# Patient Record
Sex: FEMALE | URBAN - METROPOLITAN AREA
[De-identification: names, ages, dates, MRNs, and addresses within clinical notes are randomized per-mention and may not be internally consistent; named-entity substitution may affect disease eponyms.]

---

## 2020-11-17 ENCOUNTER — OFFICE VISIT (OUTPATIENT)
Dept: FAMILY MEDICINE | Facility: CLINIC | Age: 25
End: 2020-11-17
Payer: COMMERCIAL

## 2020-11-17 VITALS
HEART RATE: 101 BPM | WEIGHT: 152.6 LBS | TEMPERATURE: 97.6 F | DIASTOLIC BLOOD PRESSURE: 80 MMHG | BODY MASS INDEX: 25.43 KG/M2 | RESPIRATION RATE: 16 BRPM | HEIGHT: 65 IN | OXYGEN SATURATION: 97 % | SYSTOLIC BLOOD PRESSURE: 100 MMHG

## 2020-11-17 DIAGNOSIS — Z87.820 HISTORY OF CONCUSSION: ICD-10-CM

## 2020-11-17 DIAGNOSIS — Z11.3 SCREENING FOR STD (SEXUALLY TRANSMITTED DISEASE): ICD-10-CM

## 2020-11-17 DIAGNOSIS — Z00.00 ANNUAL PHYSICAL EXAM: Primary | ICD-10-CM

## 2020-11-17 DIAGNOSIS — Z86.19 HX OF HERPES GENITALIS: ICD-10-CM

## 2020-11-17 DIAGNOSIS — Z83.49 FAMILY HISTORY OF HASHIMOTO THYROIDITIS: ICD-10-CM

## 2020-11-17 PROCEDURE — 90471 IMMUNIZATION ADMIN: CPT | Performed by: FAMILY MEDICINE

## 2020-11-17 PROCEDURE — 90686 IIV4 VACC NO PRSV 0.5 ML IM: CPT | Performed by: FAMILY MEDICINE

## 2020-11-17 PROCEDURE — 99385 PREV VISIT NEW AGE 18-39: CPT | Mod: 25 | Performed by: FAMILY MEDICINE

## 2020-11-17 RX ORDER — VALACYCLOVIR HYDROCHLORIDE 500 MG/1
500 TABLET, FILM COATED ORAL DAILY
Qty: 30 TABLET | Refills: 1 | Status: SHIPPED | OUTPATIENT
Start: 2020-11-17 | End: 2020-12-14

## 2020-11-17 SDOH — HEALTH STABILITY: MENTAL HEALTH: HOW OFTEN DO YOU HAVE A DRINK CONTAINING ALCOHOL?: 2-4 TIMES A MONTH
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
## 2020-11-18 ENCOUNTER — TELEPHONE (OUTPATIENT)
Dept: FAMILY MEDICINE | Facility: CLINIC | Age: 25
End: 2020-11-18

## 2020-11-18 NOTE — TELEPHONE ENCOUNTER
Can you please call patient ask whats her question? Have her sign up for my radha and send me a msg

## 2020-11-18 NOTE — TELEPHONE ENCOUNTER
Patient seen in office yesterday-has question for Dr Torres about medication RX'd at visit. [phone]

## 2020-11-19 ENCOUNTER — TELEPHONE (OUTPATIENT)
Dept: FAMILY MEDICINE | Facility: CLINIC | Age: 25
End: 2020-11-19

## 2020-11-19 NOTE — TELEPHONE ENCOUNTER
Left message for asking her to sign up on the [facility] my chart, I asked her to return the call asking her what question she has about the medication that was prescribed at the visit.

## 2020-12-10 DIAGNOSIS — Z86.19 HX OF HERPES GENITALIS: ICD-10-CM

## 2020-12-10 NOTE — TELEPHONE ENCOUNTER
Medicine Refill Request    Last Office Visit: 11/17/2020  Last Telemedicine Visit: Visit date not found    Next Office Visit: 1/12/2021  Next Telemedicine Visit: Visit date not found         Current Outpatient Medications:   •  valACYclovir (VALTREX) 500 mg tablet, Take 1 tablet (500 mg total) by mouth daily for 7 days., Disp: 30 tablet, Rfl: 1      BP Readings from Last 3 Encounters:   11/17/20 100/80       Recent Lab results:  No results found for: CHOL, No results found for: HDL, No results found for: LDLCALC, No results found for: TRIG     No results found for: GLUCOSE, No results found for: HGBA1C      No results found for: CREATININE    No results found for: TSH

## 2020-12-14 RX ORDER — VALACYCLOVIR HYDROCHLORIDE 500 MG/1
TABLET, FILM COATED ORAL
Qty: 30 TABLET | Refills: 1 | Status: SHIPPED | OUTPATIENT
Start: 2020-12-14 | End: 2021-01-07

## 2020-12-30 LAB
ALBUMIN SERPL-MCNC: 4.5 G/DL (ref 3.9–5)
ALBUMIN/GLOB SERPL: 1.8 {RATIO} (ref 1.2–2.2)
ALP SERPL-CCNC: 56 IU/L (ref 39–117)
ALT SERPL-CCNC: 10 IU/L (ref 0–32)
AST SERPL-CCNC: 21 IU/L (ref 0–40)
BASOPHILS # BLD AUTO: 0.1 X10E3/UL (ref 0–0.2)
BASOPHILS NFR BLD AUTO: 1 %
BILIRUB SERPL-MCNC: 0.7 MG/DL (ref 0–1.2)
BUN SERPL-MCNC: 9 MG/DL (ref 6–20)
BUN/CREAT SERPL: 14 (ref 9–23)
CALCIUM SERPL-MCNC: 9.4 MG/DL (ref 8.7–10.2)
CHLORIDE SERPL-SCNC: 102 MMOL/L (ref 96–106)
CHOLEST SERPL-MCNC: 163 MG/DL (ref 100–199)
CO2 SERPL-SCNC: 21 MMOL/L (ref 20–29)
CREAT SERPL-MCNC: 0.64 MG/DL (ref 0.57–1)
EOSINOPHIL # BLD AUTO: 0.2 X10E3/UL (ref 0–0.4)
EOSINOPHIL NFR BLD AUTO: 2 %
ERYTHROCYTE [DISTWIDTH] IN BLOOD BY AUTOMATED COUNT: 17.3 % (ref 11.7–15.4)
GLOBULIN SER CALC-MCNC: 2.5 G/DL (ref 1.5–4.5)
GLUCOSE SERPL-MCNC: 89 MG/DL (ref 65–99)
HBA1C MFR BLD: 4.8 % (ref 4.8–5.6)
HCT VFR BLD AUTO: 41.7 % (ref 34–46.6)
HCV AB S/CO SERPL IA: <0.1 S/CO RATIO (ref 0–0.9)
HDLC SERPL-MCNC: 62 MG/DL
HGB BLD-MCNC: 12.9 G/DL (ref 11.1–15.9)
HIV 1+2 AB+HIV1 P24 AG SERPL QL IA: NON REACTIVE
IMM GRANULOCYTES # BLD AUTO: 0 X10E3/UL (ref 0–0.1)
IMM GRANULOCYTES NFR BLD AUTO: 0 %
LAB CORP EGFR IF AFRICN AM: 144 ML/MIN/1.73
LAB CORP EGFR IF NONAFRICN AM: 125 ML/MIN/1.73
LDLC SERPL CALC-MCNC: 79 MG/DL (ref 0–99)
LYMPHOCYTES # BLD AUTO: 3.3 X10E3/UL (ref 0.7–3.1)
LYMPHOCYTES NFR BLD AUTO: 38 %
MCH RBC QN AUTO: 20.5 PG (ref 26.6–33)
MCHC RBC AUTO-ENTMCNC: 30.9 G/DL (ref 31.5–35.7)
MCV RBC AUTO: 66 FL (ref 79–97)
MONOCYTES # BLD AUTO: 0.9 X10E3/UL (ref 0.1–0.9)
MONOCYTES NFR BLD AUTO: 10 %
NEUTROPHILS # BLD AUTO: 4.3 X10E3/UL (ref 1.4–7)
NEUTROPHILS NFR BLD AUTO: 49 %
PLATELET # BLD AUTO: 345 X10E3/UL (ref 150–450)
POTASSIUM SERPL-SCNC: 4.1 MMOL/L (ref 3.5–5.2)
PROT SERPL-MCNC: 7 G/DL (ref 6–8.5)
RBC # BLD AUTO: 6.3 X10E6/UL (ref 3.77–5.28)
SODIUM SERPL-SCNC: 137 MMOL/L (ref 134–144)
T4 FREE SERPL-MCNC: 1.09 NG/DL (ref 0.82–1.77)
TRIGL SERPL-MCNC: 128 MG/DL (ref 0–149)
TSH SERPL DL<=0.005 MIU/L-ACNC: 1.86 UIU/ML (ref 0.45–4.5)
VLDLC SERPL CALC-MCNC: 22 MG/DL (ref 5–40)
WBC # BLD AUTO: 8.7 X10E3/UL (ref 3.4–10.8)

## 2021-01-04 ENCOUNTER — TELEPHONE (OUTPATIENT)
Dept: FAMILY MEDICINE | Facility: CLINIC | Age: 26
End: 2021-01-04

## 2021-01-04 NOTE — TELEPHONE ENCOUNTER
----- Message from Rema Cotton DO sent at 12/30/2020 11:13 AM EST -----  Reviewed.  No clinically significant abnormalities. I recommend patient open [employer] account to have access to results and messaging.

## 2021-01-06 DIAGNOSIS — Z86.19 HX OF HERPES GENITALIS: ICD-10-CM

## 2021-01-06 NOTE — TELEPHONE ENCOUNTER
Medicine Refill Request    Last Office Visit: 11/17/2020  Last Telemedicine Visit: Visit date not found    Next Office Visit: 1/12/2021  Next Telemedicine Visit: Visit date not found         Current Outpatient Medications:   •  valACYclovir (VALTREX) 500 mg tablet, TAKE 1 TABLET BY MOUTH EVERY DAY FOR 7 DAYS, Disp: 30 tablet, Rfl: 1      BP Readings from Last 3 Encounters:   11/17/20 100/80       Recent Lab results:  Lab Results   Component Value Date    CHOL 163 12/29/2020   ,   Lab Results   Component Value Date    HDL 62 12/29/2020   ,   Lab Results   Component Value Date    LDLCALC 79 12/29/2020   ,   Lab Results   Component Value Date    TRIG 128 12/29/2020        Lab Results   Component Value Date    GLUCOSE 89 12/29/2020   ,   Lab Results   Component Value Date    HGBA1C 4.8 12/29/2020         Lab Results   Component Value Date    CREATININE 0.64 12/29/2020       Lab Results   Component Value Date    TSH 1.860 12/29/2020

## 2021-01-07 RX ORDER — VALACYCLOVIR HYDROCHLORIDE 500 MG/1
TABLET, FILM COATED ORAL
Qty: 30 TABLET | Refills: 1 | Status: SHIPPED | OUTPATIENT
Start: 2021-01-07 | End: 2021-01-29

## 2021-01-12 ENCOUNTER — OFFICE VISIT (OUTPATIENT)
Dept: FAMILY MEDICINE | Facility: CLINIC | Age: 26
End: 2021-01-12
Payer: COMMERCIAL

## 2021-01-12 VITALS
HEIGHT: 65 IN | WEIGHT: 154.2 LBS | DIASTOLIC BLOOD PRESSURE: 74 MMHG | BODY MASS INDEX: 25.69 KG/M2 | OXYGEN SATURATION: 98 % | SYSTOLIC BLOOD PRESSURE: 128 MMHG | HEART RATE: 86 BPM | RESPIRATION RATE: 14 BRPM

## 2021-01-12 DIAGNOSIS — Z30.011 ENCOUNTER FOR INITIAL PRESCRIPTION OF CONTRACEPTIVE PILLS: ICD-10-CM

## 2021-01-12 DIAGNOSIS — Z01.411 ENCOUNTER FOR GYNECOLOGICAL EXAMINATION (GENERAL) (ROUTINE) WITH ABNORMAL FINDINGS: Primary | ICD-10-CM

## 2021-01-12 DIAGNOSIS — Z86.19 HISTORY OF HERPES GENITALIS: ICD-10-CM

## 2021-01-12 LAB — PREGNANCY TEST URINE, POC: NEGATIVE

## 2021-01-12 PROCEDURE — S0612 ANNUAL GYNECOLOGICAL EXAMINA: HCPCS | Performed by: FAMILY MEDICINE

## 2021-01-12 PROCEDURE — 81025 URINE PREGNANCY TEST: CPT | Performed by: FAMILY MEDICINE

## 2021-01-12 RX ORDER — NORETHINDRONE ACETATE AND ETHINYL ESTRADIOL, ETHINYL ESTRADIOL AND FERROUS FUMARATE 1MG-10(24)
1 KIT ORAL DAILY
Qty: 84 TABLET | Refills: 3 | Status: SHIPPED | OUTPATIENT
Start: 2021-01-12 | End: 2021-12-06

## 2021-01-12 ASSESSMENT — PATIENT HEALTH QUESTIONNAIRE - PHQ9: SUM OF ALL RESPONSES TO PHQ9 QUESTIONS 1 & 2: 0

## 2021-01-12 NOTE — PROGRESS NOTES
"      Subjective      Patient ID: Mandi Villasenor is a 25 y.o. female.  1995      HPI  Here for a pap today   Hx of genital herpes - on meds   Last pap smear was normal - 3 yrs ago   Mom had breast cancer and GM on the dad side   Not trying to get pregnant   Using condoms - wants to go back on   Sexually active with 1 partner - man   Blood test with HIOV and hepatis c neg   Had hx of chlamydia once and was treated   Had the HPV 2 vaccination   LMP 1/6/2021 - regular last 5 days   Denied any anxiety / depression   Social drinker/ non smoker , no vaping, +marijuana   The following have been reviewed and updated as appropriate in this visit:  Allergies  Meds  Problems       Review of Systems   All other systems reviewed and are negative.      Objective     Vitals:    01/12/21 1615   BP: 128/74   BP Location: Right upper arm   Patient Position: Sitting   Pulse: 86   Resp: 14   SpO2: 98%   Weight: 69.9 kg (154 lb 3.2 oz)   Height: 1.651 m (5' 5\")     Body mass index is 25.66 kg/m².    Physical Exam  Vitals signs and nursing note reviewed. Exam conducted with a chaperone present.   Constitutional:       Appearance: Normal appearance.   HENT:      Nose: Nose normal.      Mouth/Throat:      Mouth: Mucous membranes are moist.   Eyes:      Pupils: Pupils are equal, round, and reactive to light.   Chest:      Breasts:         Right: Normal.         Left: Normal.   Abdominal:      General: Abdomen is flat. Bowel sounds are normal.      Palpations: Abdomen is soft.   Genitourinary:     General: Normal vulva.   Musculoskeletal: Normal range of motion.   Lymphadenopathy:      Upper Body:      Right upper body: No supraclavicular, axillary or pectoral adenopathy.      Left upper body: No supraclavicular, axillary or pectoral adenopathy.   Neurological:      General: No focal deficit present.      Mental Status: She is alert and oriented to person, place, and time.   Psychiatric:         Mood and Affect: Mood normal.         " Behavior: Behavior normal.     chaperone by Francoise     Assessment/Plan   Diagnoses and all orders for this visit:    Encounter for gynecological examination (general) (routine) with abnormal findings (Primary)  Comments:  exam normal   will send my chart msg with results   retest on 3 years if neg   Orders:  -     Cytology, Thinprep Pap    History of herpes genitalis  Comments:  stable     Encounter for initial prescription of contraceptive pills  Comments:  counseling about using condom and taking ocp given   Orders:  -     norethindrone-e.estradioL-iron (LO LOESTRIN FE) 1 mg-10 mcg (24)/10 mcg (2) per tablet; Take 1 tablet by mouth daily.  -     POCT pregnancy, urine      Patient verbalized understanding of the above treatment plan and repeated after me.   Patient informed to go to the ER or call our office if symptoms do not improve or get worse.   The student note is for teaching purposes. Please consider my note is the only valid note.

## 2021-01-13 ENCOUNTER — TELEPHONE (OUTPATIENT)
Dept: FAMILY MEDICINE | Facility: CLINIC | Age: 26
End: 2021-01-13

## 2021-01-15 LAB
C TRACH RRNA CVX QL NAA+PROBE: NEGATIVE
CYTOLOGIST CVX/VAG CYTO: NORMAL
CYTOLOGY CVX/VAG DOC CYTO: NORMAL
CYTOLOGY CVX/VAG DOC THIN PREP: NORMAL
DX ICD CODE: NORMAL
LAB CORP .: NORMAL
LAB CORP NOTE:: NORMAL
N GONORRHOEA RRNA CVX QL NAA+PROBE: NEGATIVE
OTHER STN SPEC: NORMAL
STAT OF ADQ CVX/VAG CYTO-IMP: NORMAL
T VAGINALIS RRNA SPEC QL NAA+PROBE: NEGATIVE

## 2021-01-19 ENCOUNTER — TELEPHONE (OUTPATIENT)
Dept: FAMILY MEDICINE | Facility: CLINIC | Age: 26
End: 2021-01-19

## 2021-01-19 NOTE — TELEPHONE ENCOUNTER
----- Message from Cindy Valerio MD sent at 1/19/2021 10:12 AM EST -----  Hi Mrs. Villasenor,   Your pap smear looks normal, repeat in 3 years.   Cindy Valerio MD

## 2021-01-29 DIAGNOSIS — Z86.19 HX OF HERPES GENITALIS: ICD-10-CM

## 2021-01-29 RX ORDER — VALACYCLOVIR HYDROCHLORIDE 500 MG/1
TABLET, FILM COATED ORAL
Qty: 30 TABLET | Refills: 1 | Status: SHIPPED | OUTPATIENT
Start: 2021-01-29 | End: 2021-03-02

## 2021-01-29 NOTE — TELEPHONE ENCOUNTER
Medicine Refill Request    Last Office Visit: 1/12/2021  Last Telemedicine Visit: Visit date not found    Next Office Visit: Visit date not found  Next Telemedicine Visit: Visit date not found         Current Outpatient Medications:   •  norethindrone-e.estradioL-iron (LO LOESTRIN FE) 1 mg-10 mcg (24)/10 mcg (2) per tablet, Take 1 tablet by mouth daily., Disp: 84 tablet, Rfl: 3  •  valACYclovir (VALTREX) 500 mg tablet, TAKE 1 TABLET BY MOUTH EVERY DAY FOR 7 DAYS, Disp: 30 tablet, Rfl: 1      BP Readings from Last 3 Encounters:   01/12/21 128/74   11/17/20 100/80       Recent Lab results:  Lab Results   Component Value Date    CHOL 163 12/29/2020   ,   Lab Results   Component Value Date    HDL 62 12/29/2020   ,   Lab Results   Component Value Date    LDLCALC 79 12/29/2020   ,   Lab Results   Component Value Date    TRIG 128 12/29/2020        Lab Results   Component Value Date    GLUCOSE 89 12/29/2020   ,   Lab Results   Component Value Date    HGBA1C 4.8 12/29/2020         Lab Results   Component Value Date    CREATININE 0.64 12/29/2020       Lab Results   Component Value Date    TSH 1.860 12/29/2020

## 2021-02-28 DIAGNOSIS — Z86.19 HX OF HERPES GENITALIS: ICD-10-CM

## 2021-03-02 RX ORDER — VALACYCLOVIR HYDROCHLORIDE 500 MG/1
TABLET, FILM COATED ORAL
Qty: 30 TABLET | Refills: 1 | Status: SHIPPED | OUTPATIENT
Start: 2021-03-02 | End: 2021-03-30

## 2021-03-27 DIAGNOSIS — Z86.19 HX OF HERPES GENITALIS: ICD-10-CM

## 2021-03-30 RX ORDER — VALACYCLOVIR HYDROCHLORIDE 500 MG/1
TABLET, FILM COATED ORAL
Qty: 30 TABLET | Refills: 1 | Status: SHIPPED | OUTPATIENT
Start: 2021-03-30 | End: 2021-04-27

## 2021-04-27 DIAGNOSIS — Z86.19 HX OF HERPES GENITALIS: ICD-10-CM

## 2021-04-27 RX ORDER — VALACYCLOVIR HYDROCHLORIDE 500 MG/1
TABLET, FILM COATED ORAL
Qty: 30 TABLET | Refills: 1 | Status: SHIPPED | OUTPATIENT
Start: 2021-04-27 | End: 2021-05-28

## 2021-05-26 DIAGNOSIS — Z86.19 HX OF HERPES GENITALIS: ICD-10-CM

## 2021-05-27 NOTE — TELEPHONE ENCOUNTER
Medicine Refill Request    Last Office Visit: 1/12/2021  Next Office Visit: Visit date not found        Current Outpatient Medications:   •  norethindrone-e.estradioL-iron (LO LOESTRIN FE) 1 mg-10 mcg (24)/10 mcg (2) per tablet, Take 1 tablet by mouth daily., Disp: 84 tablet, Rfl: 3  •  valACYclovir (VALTREX) 500 mg tablet, TAKE 1 TABLET BY MOUTH EVERY DAY FOR 7 DAYS, Disp: 30 tablet, Rfl: 1      BP Readings from Last 3 Encounters:   01/12/21 128/74   11/17/20 100/80       Recent Lab results:  Lab Results   Component Value Date    CHOL 163 12/29/2020   ,   Lab Results   Component Value Date    HDL 62 12/29/2020   ,   Lab Results   Component Value Date    LDLCALC 79 12/29/2020   ,   Lab Results   Component Value Date    TRIG 128 12/29/2020        Lab Results   Component Value Date    GLUCOSE 89 12/29/2020   ,   Lab Results   Component Value Date    HGBA1C 4.8 12/29/2020         Lab Results   Component Value Date    CREATININE 0.64 12/29/2020       Lab Results   Component Value Date    TSH 1.860 12/29/2020

## 2021-05-28 RX ORDER — VALACYCLOVIR HYDROCHLORIDE 500 MG/1
TABLET, FILM COATED ORAL
Qty: 30 TABLET | Refills: 1 | Status: SHIPPED | OUTPATIENT
Start: 2021-05-28 | End: 2021-07-01

## 2021-06-30 DIAGNOSIS — Z86.19 HX OF HERPES GENITALIS: ICD-10-CM

## 2021-07-01 RX ORDER — VALACYCLOVIR HYDROCHLORIDE 500 MG/1
TABLET, FILM COATED ORAL
Qty: 30 TABLET | Refills: 1 | Status: SHIPPED | OUTPATIENT
Start: 2021-07-01 | End: 2021-08-02

## 2021-08-02 DIAGNOSIS — Z86.19 HX OF HERPES GENITALIS: ICD-10-CM

## 2021-08-02 RX ORDER — VALACYCLOVIR HYDROCHLORIDE 500 MG/1
TABLET, FILM COATED ORAL
Qty: 30 TABLET | Refills: 1 | Status: SHIPPED | OUTPATIENT
Start: 2021-08-02 | End: 2021-08-30

## 2021-08-30 DIAGNOSIS — Z86.19 HX OF HERPES GENITALIS: ICD-10-CM

## 2021-08-30 RX ORDER — VALACYCLOVIR HYDROCHLORIDE 500 MG/1
TABLET, FILM COATED ORAL
Qty: 30 TABLET | Refills: 1 | Status: SHIPPED | OUTPATIENT
Start: 2021-08-30 | End: 2021-09-29

## 2021-09-29 DIAGNOSIS — Z86.19 HX OF HERPES GENITALIS: ICD-10-CM

## 2021-09-29 RX ORDER — VALACYCLOVIR HYDROCHLORIDE 500 MG/1
TABLET, FILM COATED ORAL
Qty: 30 TABLET | Refills: 1 | Status: SHIPPED | OUTPATIENT
Start: 2021-09-29 | End: 2021-10-25

## 2021-10-22 DIAGNOSIS — Z86.19 HX OF HERPES GENITALIS: ICD-10-CM

## 2021-10-25 RX ORDER — VALACYCLOVIR HYDROCHLORIDE 500 MG/1
TABLET, FILM COATED ORAL
Qty: 30 TABLET | Refills: 1 | Status: SHIPPED | OUTPATIENT
Start: 2021-10-25 | End: 2021-11-26

## 2021-11-25 DIAGNOSIS — Z86.19 HX OF HERPES GENITALIS: ICD-10-CM

## 2021-11-26 RX ORDER — VALACYCLOVIR HYDROCHLORIDE 500 MG/1
TABLET, FILM COATED ORAL
Qty: 30 TABLET | Refills: 1 | Status: SHIPPED | OUTPATIENT
Start: 2021-11-26 | End: 2021-12-27

## 2021-11-26 NOTE — TELEPHONE ENCOUNTER
Medicine Refill Request    Last Office Visit: 1/12/2021  Last Telemedicine Visit: Visit date not found    Next Office Visit: Visit date not found  Next Telemedicine Visit: Visit date not found         Current Outpatient Medications:   •  norethindrone-e.estradioL-iron (LO LOESTRIN FE) 1 mg-10 mcg (24)/10 mcg (2) per tablet, Take 1 tablet by mouth daily., Disp: 84 tablet, Rfl: 3  •  valACYclovir 500 mg tablet, TAKE 1 TABLET BY MOUTH EVERY DAY FOR 7 DAYS, Disp: 30 tablet, Rfl: 1      BP Readings from Last 3 Encounters:   01/12/21 128/74   11/17/20 100/80       Recent Lab results:  Lab Results   Component Value Date    CHOL 163 12/29/2020   ,   Lab Results   Component Value Date    HDL 62 12/29/2020   ,   Lab Results   Component Value Date    LDLCALC 79 12/29/2020   ,   Lab Results   Component Value Date    TRIG 128 12/29/2020        Lab Results   Component Value Date    GLUCOSE 89 12/29/2020   ,   Lab Results   Component Value Date    HGBA1C 4.8 12/29/2020         Lab Results   Component Value Date    CREATININE 0.64 12/29/2020       Lab Results   Component Value Date    TSH 1.860 12/29/2020

## 2021-12-06 DIAGNOSIS — Z30.011 ENCOUNTER FOR INITIAL PRESCRIPTION OF CONTRACEPTIVE PILLS: ICD-10-CM

## 2021-12-06 RX ORDER — NORETHINDRONE ACETATE AND ETHINYL ESTRADIOL, ETHINYL ESTRADIOL AND FERROUS FUMARATE 1MG-10(24)
KIT ORAL
Qty: 84 TABLET | Refills: 3 | Status: SHIPPED | OUTPATIENT
Start: 2021-12-06 | End: 2022-11-21

## 2021-12-25 DIAGNOSIS — Z86.19 HX OF HERPES GENITALIS: ICD-10-CM

## 2021-12-27 RX ORDER — VALACYCLOVIR HYDROCHLORIDE 500 MG/1
TABLET, FILM COATED ORAL
Qty: 30 TABLET | Refills: 1 | Status: SHIPPED | OUTPATIENT
Start: 2021-12-27 | End: 2022-01-26

## 2021-12-27 NOTE — TELEPHONE ENCOUNTER
Medicine Refill Request    Last Office: 1/12/2021   Last Consult Visit: Visit date not found  Last Telemedicine Visit: Visit date not found    Next Appointment: Visit date not found      Current Outpatient Medications:   •  LO LOESTRIN FE 1 mg-10 mcg (24)/10 mcg (2) per tablet, TAKE 1 TABLET BY MOUTH EVERY DAY, Disp: 84 tablet, Rfl: 3  •  valACYclovir 500 mg tablet, TAKE 1 TABLET BY MOUTH EVERY DAY FOR 7 DAYS, Disp: 30 tablet, Rfl: 1      BP Readings from Last 3 Encounters:   01/12/21 128/74   11/17/20 100/80       Recent Lab results:  Lab Results   Component Value Date    CHOL 163 12/29/2020   ,   Lab Results   Component Value Date    HDL 62 12/29/2020   ,   Lab Results   Component Value Date    LDLCALC 79 12/29/2020   ,   Lab Results   Component Value Date    TRIG 128 12/29/2020        Lab Results   Component Value Date    GLUCOSE 89 12/29/2020   ,   Lab Results   Component Value Date    HGBA1C 4.8 12/29/2020         Lab Results   Component Value Date    CREATININE 0.64 12/29/2020       Lab Results   Component Value Date    TSH 1.860 12/29/2020

## 2022-01-26 DIAGNOSIS — Z86.19 HX OF HERPES GENITALIS: ICD-10-CM

## 2022-01-26 RX ORDER — VALACYCLOVIR HYDROCHLORIDE 500 MG/1
TABLET, FILM COATED ORAL
Qty: 30 TABLET | Refills: 1 | Status: SHIPPED | OUTPATIENT
Start: 2022-01-26 | End: 2022-02-17

## 2022-02-17 DIAGNOSIS — Z86.19 HX OF HERPES GENITALIS: ICD-10-CM

## 2022-02-17 RX ORDER — VALACYCLOVIR HYDROCHLORIDE 500 MG/1
TABLET, FILM COATED ORAL
Qty: 30 TABLET | Refills: 1 | Status: SHIPPED | OUTPATIENT
Start: 2022-02-17 | End: 2022-03-17

## 2022-03-16 DIAGNOSIS — Z86.19 HX OF HERPES GENITALIS: ICD-10-CM

## 2022-03-16 NOTE — TELEPHONE ENCOUNTER
Medicine Refill Request    Last Office: 1/12/2021   Last Consult Visit: Visit date not found  Last Telemedicine Visit: Visit date not found    Next Appointment: Visit date not found      Current Outpatient Medications:   •  LO LOESTRIN FE 1 mg-10 mcg (24)/10 mcg (2) per tablet, TAKE 1 TABLET BY MOUTH EVERY DAY, Disp: 84 tablet, Rfl: 3  •  valACYclovir (VALTREX) 500 mg tablet, TAKE 1 TABLET BY MOUTH EVERY DAY FOR 7 DAYS, Disp: 30 tablet, Rfl: 1      BP Readings from Last 3 Encounters:   01/12/21 128/74   11/17/20 100/80       Recent Lab results:  Lab Results   Component Value Date    CHOL 163 12/29/2020   ,   Lab Results   Component Value Date    HDL 62 12/29/2020   ,   Lab Results   Component Value Date    LDLCALC 79 12/29/2020   ,   Lab Results   Component Value Date    TRIG 128 12/29/2020        Lab Results   Component Value Date    GLUCOSE 89 12/29/2020   ,   Lab Results   Component Value Date    HGBA1C 4.8 12/29/2020         Lab Results   Component Value Date    CREATININE 0.64 12/29/2020       Lab Results   Component Value Date    TSH 1.860 12/29/2020

## 2022-03-17 RX ORDER — VALACYCLOVIR HYDROCHLORIDE 500 MG/1
TABLET, FILM COATED ORAL
Qty: 30 TABLET | Refills: 1 | Status: SHIPPED | OUTPATIENT
Start: 2022-03-17 | End: 2022-04-19

## 2022-04-15 DIAGNOSIS — Z86.19 HX OF HERPES GENITALIS: ICD-10-CM

## 2022-04-19 RX ORDER — VALACYCLOVIR HYDROCHLORIDE 500 MG/1
TABLET, FILM COATED ORAL
Qty: 30 TABLET | Refills: 1 | Status: SHIPPED | OUTPATIENT
Start: 2022-04-19 | End: 2022-08-30 | Stop reason: SDUPTHER

## 2022-08-30 ENCOUNTER — OFFICE VISIT (OUTPATIENT)
Dept: FAMILY MEDICINE | Facility: CLINIC | Age: 27
End: 2022-08-30
Payer: COMMERCIAL

## 2022-08-30 VITALS
WEIGHT: 158.4 LBS | BODY MASS INDEX: 26.39 KG/M2 | SYSTOLIC BLOOD PRESSURE: 130 MMHG | RESPIRATION RATE: 16 BRPM | HEIGHT: 65 IN | DIASTOLIC BLOOD PRESSURE: 80 MMHG | OXYGEN SATURATION: 98 % | TEMPERATURE: 97.5 F

## 2022-08-30 DIAGNOSIS — Z78.9 TRYING TO GET PREGNANT: Primary | ICD-10-CM

## 2022-08-30 DIAGNOSIS — Z86.19 HX OF HERPES GENITALIS: ICD-10-CM

## 2022-08-30 PROCEDURE — 99213 OFFICE O/P EST LOW 20 MIN: CPT | Performed by: FAMILY MEDICINE

## 2022-08-30 PROCEDURE — 3008F BODY MASS INDEX DOCD: CPT | Performed by: FAMILY MEDICINE

## 2022-08-30 RX ORDER — VALACYCLOVIR HYDROCHLORIDE 500 MG/1
500 TABLET, FILM COATED ORAL DAILY
Qty: 30 TABLET | Refills: 1 | Status: SHIPPED | OUTPATIENT
Start: 2022-08-30 | End: 2022-09-29 | Stop reason: SDUPTHER

## 2022-08-30 NOTE — PROGRESS NOTES
"      Subjective      Patient ID: Mandi Villasenor is a 27 y.o. female.  1995      HPI  27-year-old female here for pregnancy counseling.   Patient is trying to get pregnant and she is has stopped taking her birth control pills she is using condoms right now till December and she thinks he is doing it and she wants to try getting pregnant with her  in December.  Patient has some questions about her herpes.  Patient and and her  has both history of her genital herpes last outbreak for her was more than a year and a half ago.  Patient is on suppressive treatment but she stopped using it for the past 2 months.  Patient has not had any issues with rashes or itching.    Patient was wondering if herpes can affect her pregnancy.  Patient reported no vaginal discharge no history of other STDs.  Patient had Pap smear done in January 2021 was normal STD negative HPV negative.    Patient is not a smoker she does not use any drugs she is trying to avoid alcohol  Patient denied any depression or anxiety.    Patient wants a refill on her Valtrex just in case if she has any episodes of herpes.   The following have been reviewed and updated as appropriate in this visit:   Tobacco         Review of Systems   All other systems reviewed and are negative.      Objective     Vitals:    08/30/22 1535 08/30/22 1555   BP: (!) 144/90 130/80   BP Location: Left upper arm    Patient Position: Sitting    Resp: 16    Temp: 36.4 °C (97.5 °F)    TempSrc: Temporal    SpO2: 98%    Weight: 71.8 kg (158 lb 6.4 oz)    Height: 1.651 m (5' 5\")      Body mass index is 26.36 kg/m².    Physical Exam  Vitals and nursing note reviewed.   Constitutional:       Appearance: Normal appearance.   HENT:      Head: Normocephalic and atraumatic.      Right Ear: Tympanic membrane normal.      Nose: Nose normal.   Musculoskeletal:      Cervical back: Normal range of motion.   Neurological:      General: No focal deficit present.      Mental Status: " She is alert and oriented to person, place, and time.   Psychiatric:         Mood and Affect: Mood normal.         Behavior: Behavior normal.         Thought Content: Thought content normal.         Judgment: Judgment normal.         Assessment/Plan   Diagnoses and all orders for this visit:    Trying to get pregnant (Primary)  Comments:  Advised patient to take prenatal vitamin eat healthy avoid alcohol smoking.  Patient will try to actively getting pregnant around December she will let us know     Hx of herpes genitalis  Comments:  Given medication refills advised patient if she wants to stop it she will watch out for any breakouts if she has  She can take 1000 mg twice daily for 3 to 5 d  Orders:  -     valACYclovir (VALTREX) 500 mg tablet; Take 1 tablet (500 mg total) by mouth daily.      Patient verbalized understanding of the above treatment plan and repeated after me.   Patient informed to go to the ER or call our office if symptoms do not improve or get worse.   The student note is for teaching purposes. Please consider my note is the only valid note.

## 2022-09-29 DIAGNOSIS — Z86.19 HX OF HERPES GENITALIS: ICD-10-CM

## 2022-09-29 RX ORDER — VALACYCLOVIR HYDROCHLORIDE 500 MG/1
500 TABLET, FILM COATED ORAL DAILY
Qty: 90 TABLET | Refills: 1 | Status: SHIPPED | OUTPATIENT
Start: 2022-09-29

## 2022-09-29 NOTE — TELEPHONE ENCOUNTER
Medicine Refill Request    Last Office: 8/30/2022   Last Consult Visit: Visit date not found  Last Telemedicine Visit: Visit date not found    Next Appointment: Visit date not found      Current Outpatient Medications:     LO LOESTRIN FE 1 mg-10 mcg (24)/10 mcg (2) per tablet, TAKE 1 TABLET BY MOUTH EVERY DAY (Patient not taking: Reported on 8/30/2022), Disp: 84 tablet, Rfl: 3    valACYclovir (VALTREX) 500 mg tablet, Take 1 tablet (500 mg total) by mouth daily., Disp: 30 tablet, Rfl: 1      BP Readings from Last 3 Encounters:   08/30/22 130/80   01/12/21 128/74   11/17/20 100/80       Recent Lab results:  Lab Results   Component Value Date    CHOL 163 12/29/2020   ,   Lab Results   Component Value Date    HDL 62 12/29/2020   ,   Lab Results   Component Value Date    LDLCALC 79 12/29/2020   ,   Lab Results   Component Value Date    TRIG 128 12/29/2020        Lab Results   Component Value Date    GLUCOSE 89 12/29/2020   ,   Lab Results   Component Value Date    HGBA1C 4.8 12/29/2020         Lab Results   Component Value Date    CREATININE 0.64 12/29/2020       Lab Results   Component Value Date    TSH 1.860 12/29/2020

## 2022-11-20 DIAGNOSIS — Z30.011 ENCOUNTER FOR INITIAL PRESCRIPTION OF CONTRACEPTIVE PILLS: ICD-10-CM

## 2022-11-21 RX ORDER — NORETHINDRONE ACETATE AND ETHINYL ESTRADIOL, ETHINYL ESTRADIOL AND FERROUS FUMARATE 1MG-10(24)
KIT ORAL
Qty: 84 TABLET | Refills: 3 | Status: SHIPPED | OUTPATIENT
Start: 2022-11-21 | End: 2023-09-19

## 2022-11-21 NOTE — TELEPHONE ENCOUNTER
Medicine Refill Request    Last Office: 8/30/2022   Last Consult Visit: Visit date not found  Last Telemedicine Visit: Visit date not found    Next Appointment: Visit date not found      Current Outpatient Medications:     LO LOESTRIN FE 1 mg-10 mcg (24)/10 mcg (2) per tablet, TAKE 1 TABLET BY MOUTH EVERY DAY (Patient not taking: Reported on 8/30/2022), Disp: 84 tablet, Rfl: 3    valACYclovir (VALTREX) 500 mg tablet, Take 1 tablet (500 mg total) by mouth daily., Disp: 90 tablet, Rfl: 1      BP Readings from Last 3 Encounters:   08/30/22 130/80   01/12/21 128/74   11/17/20 100/80       Recent Lab results:  Lab Results   Component Value Date    CHOL 163 12/29/2020   ,   Lab Results   Component Value Date    HDL 62 12/29/2020   ,   Lab Results   Component Value Date    LDLCALC 79 12/29/2020   ,   Lab Results   Component Value Date    TRIG 128 12/29/2020        Lab Results   Component Value Date    GLUCOSE 89 12/29/2020   ,   Lab Results   Component Value Date    HGBA1C 4.8 12/29/2020         Lab Results   Component Value Date    CREATININE 0.64 12/29/2020       Lab Results   Component Value Date    TSH 1.860 12/29/2020

## 2023-09-19 ENCOUNTER — OFFICE VISIT (OUTPATIENT)
Dept: FAMILY MEDICINE | Facility: CLINIC | Age: 28
End: 2023-09-19
Payer: COMMERCIAL

## 2023-09-19 VITALS
HEIGHT: 65 IN | TEMPERATURE: 98.3 F | HEART RATE: 98 BPM | DIASTOLIC BLOOD PRESSURE: 78 MMHG | RESPIRATION RATE: 16 BRPM | BODY MASS INDEX: 27.16 KG/M2 | WEIGHT: 163 LBS | SYSTOLIC BLOOD PRESSURE: 136 MMHG | OXYGEN SATURATION: 98 %

## 2023-09-19 DIAGNOSIS — Z31.69 ENCOUNTER FOR PRECONCEPTION CONSULTATION: Primary | ICD-10-CM

## 2023-09-19 LAB
EXPIRATION DATE: ABNORMAL
Lab: ABNORMAL
POCT MANUFACTURER: ABNORMAL
PREGNANCY TEST URINE, POC: POSITIVE

## 2023-09-19 PROCEDURE — 99213 OFFICE O/P EST LOW 20 MIN: CPT | Mod: GE,25

## 2023-09-19 PROCEDURE — 90471 IMMUNIZATION ADMIN: CPT

## 2023-09-19 PROCEDURE — 90686 IIV4 VACC NO PRSV 0.5 ML IM: CPT

## 2023-09-19 PROCEDURE — 3008F BODY MASS INDEX DOCD: CPT

## 2023-09-19 PROCEDURE — 81025 URINE PREGNANCY TEST: CPT

## 2023-09-19 ASSESSMENT — PATIENT HEALTH QUESTIONNAIRE - PHQ9: SUM OF ALL RESPONSES TO PHQ9 QUESTIONS 1 & 2: 0

## 2023-09-19 NOTE — PATIENT INSTRUCTIONS
Pregnancy Checklist:    Diet: please take about 400-800mcg of supplemental folate in your diet. Do not over-eat vitamins - high amounts of vitamin A have been linked to negative pregnancy outcomes. As for food, avoid undercooked meats, cheeses, and fish.  Medications: please review your medications with your doctor to see if they are teratogenic (harmful to the baby).   Please avoid nicotine, alcohol, THC (marijuana), and opioids. Coffee/soda (caffeinated beverages) are okay in small amounts.

## 2023-09-19 NOTE — PROGRESS NOTES
"Subjective     Patient ID: Mandi Villasenor is a 28 y.o. female is here for the following:  Chief Complaint   Patient presents with    pregnancy        HPI   Preconception Visit  LMP: August 10th  Chronic Diseases: none  Medications (any teratogenic): none  Reproductive History: none  Genetic Conditions/FHx: cousin with unicornuate (?) uterus; cousin has some sort of genetic disorder related to MS. Mom has hashimoto's.  Substance use: none  Infectious disease (GC, HIV, Hep C, TB): low risk - [place]  Nutrition (folate): taking prenatal vitamins  Weight management: no need.  Social and mental health concerns: none    Has been with current partner for 5 years.  for over a year.    Review of Systems   Per HPI    The following have been reviewed and updated as appropriate in this visit:   Allergies  Meds  Problems         Patient Active Problem List   Diagnosis    Vasodepressor syncope    Encounter for preconception consultation         Current Outpatient Medications:     valACYclovir (VALTREX) 500 mg tablet, Take 1 tablet (500 mg total) by mouth daily., Disp: 90 tablet, Rfl: 1    No Known Allergies    Objective   Vitals:    09/19/23 1807   BP: 136/78   BP Location: Right upper arm   Patient Position: Sitting   Pulse: 98   Resp: 16   Temp: 36.8 °C (98.3 °F)   TempSrc: Temporal   SpO2: 98%   Weight: 73.9 kg (163 lb)   Height: 1.651 m (5' 5\")     Body mass index is 27.12 kg/m².    Physical Exam  Constitutional:       Appearance: Normal appearance.   Cardiovascular:      Rate and Rhythm: Normal rate and regular rhythm.      Heart sounds: Normal heart sounds. No murmur heard.     No friction rub. No gallop.   Pulmonary:      Effort: Pulmonary effort is normal. No respiratory distress.      Breath sounds: Normal breath sounds. No stridor. No wheezing, rhonchi or rales.   Chest:      Chest wall: No tenderness.   Musculoskeletal:      Right lower leg: No edema.      Left lower leg: No edema. "   Neurological:      Mental Status: She is alert.         Assessment/Plan    Diagnoses and all orders for this visit:    Encounter for preconception consultation (Primary)  Assessment & Plan:  6 weeks pregnant.  Beta-hcg today positive  No risk factors for complicated pregnancy  First pregnancy  Taking folate    - referred to ob for further care  - no concerns at today's visit  - recommended she obtain covid vaccine at future visit at pharmacy    Orders:  -     Influenza vaccine quadrivalent preservative free 6 mon and older IM (FluLaval)  -     Ambulatory referral to Obstetrics / Gynecology  -     POCT pregnancy, urine       No follow-ups on file.        Alexander Moya MD  9/19/2023

## 2023-09-27 ENCOUNTER — TELEPHONE (OUTPATIENT)
Dept: OBGYN CLINIC | Facility: CLINIC | Age: 28
End: 2023-09-27

## 2023-09-27 NOTE — TELEPHONE ENCOUNTER
Called and left vm for pt with appt for FPN appt with Dr. Jennifer Louise in [place] on 10/16 at 2:40. RN appt will be virtual.  When pt calls back please confirm appt time/date and ask what time would work for virtual visit. Please advise LG of scheduling.    JULIA

## 2023-09-29 NOTE — PROGRESS NOTES
I have discussed the patient's care with the Resident. I have reviewed the patient's history and Resident's findings on exam, the patient's diagnosis/differential diagnosis and treatment plan. I concur with the treatment plan as documented by the Resident, except for my comments below or within additional notes today.    Mian Bocanegra MD

## 2023-10-11 NOTE — TELEPHONE ENCOUNTER
Left message for pt offering her morning of 10/12 or morning of 10/16 for her virtual B intake visit (per LG).

## 2023-10-11 NOTE — TELEPHONE ENCOUNTER
Patient called to let us know that no one has called her to schedule her OB intake Virtual visit. Patient's first OB visit is scheduled with BM on 10/16. Please schedule patient for intake virtual visit. Thank you!

## 2023-10-16 ENCOUNTER — INITIAL PRENATAL (OUTPATIENT)
Dept: OBGYN CLINIC | Facility: CLINIC | Age: 28
End: 2023-10-16

## 2023-10-16 ENCOUNTER — TELEMEDICINE (OUTPATIENT)
Dept: OBGYN CLINIC | Facility: CLINIC | Age: 28
End: 2023-10-16

## 2023-10-16 VITALS
HEIGHT: 65 IN | DIASTOLIC BLOOD PRESSURE: 58 MMHG | WEIGHT: 163 LBS | SYSTOLIC BLOOD PRESSURE: 118 MMHG | BODY MASS INDEX: 27.16 KG/M2

## 2023-10-16 DIAGNOSIS — Z13.71 SCREENING FOR GENETIC DISEASE CARRIER STATUS: ICD-10-CM

## 2023-10-16 DIAGNOSIS — Z34.91 PRENATAL CARE IN FIRST TRIMESTER: ICD-10-CM

## 2023-10-16 DIAGNOSIS — Z11.3 ROUTINE SCREENING FOR STI (SEXUALLY TRANSMITTED INFECTION): ICD-10-CM

## 2023-10-16 DIAGNOSIS — A60.09 GENITAL HERPES AFFECTING PREGNANCY IN FIRST TRIMESTER: ICD-10-CM

## 2023-10-16 DIAGNOSIS — Z12.4 SCREENING FOR CERVICAL CANCER: ICD-10-CM

## 2023-10-16 DIAGNOSIS — F12.91 HISTORY OF MARIJUANA USE: ICD-10-CM

## 2023-10-16 DIAGNOSIS — Z83.3 FAMILY HISTORY OF GESTATIONAL DIABETES: ICD-10-CM

## 2023-10-16 DIAGNOSIS — O98.311 GENITAL HERPES AFFECTING PREGNANCY IN FIRST TRIMESTER: ICD-10-CM

## 2023-10-16 DIAGNOSIS — Z34.91 PREGNANCY WITH UNCERTAIN DATES IN FIRST TRIMESTER: Primary | ICD-10-CM

## 2023-10-16 DIAGNOSIS — Z84.89 FAMILY HISTORY OF GENETIC DISORDER: ICD-10-CM

## 2023-10-16 DIAGNOSIS — Z3A.09 9 WEEKS GESTATION OF PREGNANCY: ICD-10-CM

## 2023-10-16 DIAGNOSIS — Z36.89 ENCOUNTER FOR OTHER SPECIFIED ANTENATAL SCREENING: Primary | ICD-10-CM

## 2023-10-16 DIAGNOSIS — Z36.89 ENCOUNTER FOR OTHER SPECIFIED ANTENATAL SCREENING: ICD-10-CM

## 2023-10-16 PROBLEM — O98.319 GENITAL HERPES AFFECTING PREGNANCY: Status: ACTIVE | Noted: 2023-10-16

## 2023-10-16 PROBLEM — A60.00 GENITAL HSV: Status: ACTIVE | Noted: 2023-10-16

## 2023-10-16 LAB
EXTERNAL CHLAMYDIA SCREEN: NEGATIVE
EXTERNAL GONORRHEA SCREEN: NEGATIVE
SL AMB  POCT GLUCOSE, UA: NEGATIVE
SL AMB POCT URINE PROTEIN: NEGATIVE

## 2023-10-16 RX ORDER — VALACYCLOVIR HYDROCHLORIDE 500 MG/1
500 TABLET, FILM COATED ORAL 2 TIMES DAILY
COMMUNITY

## 2023-10-16 RX ORDER — CETIRIZINE HYDROCHLORIDE 10 MG/1
TABLET ORAL
COMMUNITY
End: 2023-10-16

## 2023-10-16 NOTE — ASSESSMENT & PLAN NOTE
- Patient has a paternal cousin with SMA  - SMA/CF carrier screening ordered with initial prenatal panel. ,

## 2023-10-16 NOTE — PROGRESS NOTES
[address]  [address] [facility], [place], [address]    Assessment/Plan:  Audelia Degroot is a 33y.o. year old who presents for evaluation of amenorrhea. Amenorrhea  - Single live IUP identified on US today. Estimated Date of Delivery: 5/16/24 based on LMP. - Recommend prenatal vitamin with folic acid  - Ultrasound completed, please see Chart Review - Ob Procedures, Ultrasound Report for Interpretation.  - Nursing notes from OB intake reviewed. - Aneuploidy screening discussed. Patient desires screening. Referred to [facility] for NT and genetic counseling.  - Routine cervical cancer screening: Pap Done today. - Routine STI Screening: GC/Chlamydia sent today. HIV/Hep B/Syphilis ordered in prenatal panel.  - Patient Education: Patient was counseled regarding diet, exercise, weight gain, foods to avoid, vaccines in pregnancy, aneuploidy screening, travel precautions to include seat belt use and VTE risk reduction. She has been provided our pregnancy packet which includes how and when to contact providers, medication recommendations, dietary suggestions, breastfeeding information as well as websites for additional information, hospital and delivery concerns. Additional Pregnancy Problems:   1. Pregnancy with uncertain dates in first trimester  -     AMB US OB < 14 weeks single or first gestation level 1    2. Family history of genetic disorder  Assessment & Plan:  - Patient has a paternal cousin with SMA  - SMA/CF carrier screening ordered with initial prenatal panel. Orders:  -     Inheritest CF/SMA Panel; Future  -     Inheritest CF/SMA Panel    3. Genital herpes affecting pregnancy in first trimester    4. History of marijuana use  Assessment & Plan:  - Quit with knowledge of pregnancy.    - UDS sent with initial prenatal panel, per [facility] protocol  - Patient counseled regarding recommendation to abstain from cannabis products in pregnancy due to some evidence to suggest negative impact on fetal neurologic development.   - Plan for repeat UDS on admission. Patient agrees. Orders:  -     Ambulatory Referral to Maternal Fetal Medicine; Future; Expected date: 10/17/2023  -     Rapid drug screen, urine; Future  -     Rapid drug screen, urine    5. Encounter for other specified  screening  -     Pregnancy, Initial Screen; Future  -     Ambulatory Referral to Social Work Care Management Program; Future  -     Ambulatory Referral to Maternal Fetal Medicine; Future; Expected date: 10/17/2023  -     POCT urine dip  -     Pregnancy, Initial Screen    6. Prenatal care in first trimester  -     Ambulatory Referral to Social Work Care Management Program; Future    7. Screening for genetic disease carrier status  -     Inheritest CF/SMA Panel; Future  -     Inheritest CF/SMA Panel    8. Screening for cervical cancer  -     IGP, CtNg, rfx Aptima HPV ASCU    9. Routine screening for STI (sexually transmitted infection)  -     IGP, CtNg, rfx Aptima HPV ASCU    10. 9 weeks gestation of pregnancy        Subjective:   CC:  Amenorrhea  Felix Zhao is a 33 y.o.  female who presents for amenorrhea, now with confirmed viable pregnancy. Pregnancy ROS: No leakage of fluid, pelvic pain, or vaginal bleeding.         Past Medical History:   Diagnosis Date    Concussion 2020    MRI was negative for torticy    Herpes 2018    Vasodepressor syncope     between ages [age]     Past Surgical History:   Procedure Laterality Date    CHALAZION EXCISION Right     right eye    WRIST FRACTURE SURGERY Right 2019    plates and screws     Family History   Problem Relation Age of Onset    Hypertension Mother     Diabetes Mother         Gestational    Thyroid disease Mother         Hypothyriodism    Migraines Father     Gallbladder disease Father     No Known Problems Brother     No Known Problems Maternal Grandmother     Gallbladder disease Maternal Grandfather     Hyperlipidemia Paternal Grandmother     Ulcers Paternal Grandmother     Prostate cancer Paternal Grandfather     Diabetes Maternal Aunt      Social History     Socioeconomic History    Marital status: /Civil Union     Spouse name: None    Number of children: None    Years of education: None    Highest education level: None   Occupational History    None   Tobacco Use    Smoking status: Former     Packs/day: 0.25     Years: 3.00     Total pack years: 0.75     Types: Cigarettes     Start date: 2015     Quit date: 2018     Years since quittin.1    Smokeless tobacco: Never   Vaping Use    Vaping Use: Former    Quit date: 2023    Substances: Nicotine   Substance and Sexual Activity    Alcohol use: Not Currently     Comment: Overall 3 drinks per week    Drug use: Not Currently     Types: Marijuana     Comment: last use 2023    Sexual activity: Yes     Partners: Male     Birth control/protection: None   Other Topics Concern    None   Social History Narrative    None     Social Determinants of Health     Financial Resource Strain: Not on file   Food Insecurity: Not on file   Transportation Needs: Not on file   Physical Activity: Not on file   Stress: Not on file   Social Connections: Not on file   Intimate Partner Violence: Not on file   Housing Stability: Not on file     Outpatient Medications Marked as Taking for the 10/16/23 encounter (Initial Prenatal) with Alysa Orozco MD   Medication    Prenatal Vit-Fe Fumarate-FA (PRENATAL VITAMIN PO)    valACYclovir (VALTREX) 500 mg tablet     Allergies   Allergen Reactions    Seasonal Ic [Octacosanol] Allergic Rhinitis    Pork-Derived Products - Food Allergy Rash     FIRST TRIMESTER OBSTETRIC ULTRASOUND  Date of study: 10/16/2023  Performed by: Caro Farley MD     INDICATION: Amenorrhea, viability    COMPARISON: None. TECHNIQUE:   Transvaginal imaging was performed to assess the gestation, myometrial/endometrial architecture and ovarian parenchymal detail.     The study includes volumetric sweeps and traditional still imaging technique. FINDINGS:     A single intrauterine gestation is identified. Cardiac activity is detected at 182 bpm.      YOLK SAC:  Present and normal in size and appearance. MEAN CROWN RUMP LENGTH:  30.1 mm = 9 weeks 5 days   AMNIOTIC FLUID/SAC SHAPE:  Within expected normal range. UTERUS/ADNEXA:   No adnexal mass or pathologic cyst.  No free fluid identified. IMPRESSION:    Patient's last menstrual period was 08/10/2023 (exact date). Gestational age based on LMP: 11w1d  Gestational age based on US: 6w9d    Will assign dating based on LMP  Final Gestational age: 11w1d   Final Estimated Date of Delivery: 24   Fetal cardiac activity detected. No adnexal masses seen. Taylor Canela MD  10/16/2023 3:27 PM        Objective:     /58   Ht 5' 4.5" (1.638 m)   Wt 73.9 kg (163 lb)   LMP 08/10/2023 (Exact Date)   BMI 27.55 kg/m²   Pregravid Weight/BMI: 70.3 kg (155 lb) (BMI 26.20)  Current Weight: 73.9 kg (163 lb)   Total Weight Gain: 3.629 kg (8 lb)   Pre-Liz Vitals      Flowsheet Row Most Recent Value   Prenatal Assessment    Fetal Heart Rate 182   Fundal Height (cm) 9 cm   Prenatal Vitals    Blood Pressure 118/58   Weight - Scale 73.9 kg (163 lb)   Urine Albumin/Glucose    Dilation/Effacement/Station    Vaginal Drainage    Draining Fluid No   Edema    LLE Edema None   RLE Edema None   Facial Edema None             Chaperone present? Yes: Martha Vences MA. General Appearance: alert and oriented, in no acute distress. Neck/Thyroid: No thyromegaly, no thyroid nodules. Respiratory: Unlabored breathing. Cardiovascular: Regular rate, no peripheral edema. Abdomen: Soft, non-tender, non-distended, no masses, no rebound or guarding. Breast Exam: No dimpling, nipple retraction or discharge. No lumps or masses. No axillary or supraclavicular nodes. Pelvic:       External genitalia: Normal appearance, no abnormal pigmentation, no lesions or masses.  Normal Bartholin's and Brenham's. Urinary system: Urethral meatus normal, bladder non-tender. Vaginal: normal mucosa without prolapse or lesions. Normal-appearing physiologic discharge. Cervix: Normal-appearing, well-epithelialized, no gross lesions or masses. No cervical motion tenderness. Adnexa: No adnexal masses or tenderness noted. Uterus: Approximately 9 week-sized, regular contour, midline, mobile, no uterine tenderness. Extremities: Normal range of motion. Warm, well-perfused, non-tender.    Skin: normal, no rash or abnormalities  Neurologic: alert, oriented x3  Psychiatric: Appropriate affect, mood stable, cooperative with exam.        Yovana Mcghee MD  10/16/2023 3:37 PM
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
## 2023-10-16 NOTE — ASSESSMENT & PLAN NOTE
- Quit with knowledge of pregnancy. - UDS sent with initial prenatal panel, per [facility] protocol  - Patient counseled regarding recommendation to abstain from cannabis products in pregnancy due to some evidence to suggest negative impact on fetal neurologic development.   - Plan for repeat UDS on admission. Patient agrees. No driving until cleared  Daily dry dressing changes with gauze and tape   No lotions or ointment to incision site   Continue wearing compression stocking on during the day and off at night

## 2023-10-17 ENCOUNTER — TELEPHONE (OUTPATIENT)
Dept: PERINATAL CARE | Facility: OTHER | Age: 28
End: 2023-10-17

## 2023-10-17 NOTE — TELEPHONE ENCOUNTER
Called patient to schedule MFM appointment, based on referral issued to Maternal Fetal Medicine by [facility] office. Left voicemail requesting patient to call back and schedule appointment, with office number for return call [phone].

## 2023-10-19 ENCOUNTER — PATIENT OUTREACH (OUTPATIENT)
Dept: OBGYN CLINIC | Facility: CLINIC | Age: 28
End: 2023-10-19

## 2023-10-19 LAB
C TRACH RRNA CVX QL NAA+PROBE: NEGATIVE
CYTOLOGIST CVX/VAG CYTO: NORMAL
DX ICD CODE: NORMAL
N GONORRHOEA RRNA CVX QL NAA+PROBE: NEGATIVE
OTHER STN SPEC: NORMAL
OTHER STN SPEC: NORMAL
PATH REPORT.FINAL DX SPEC: NORMAL
SL AMB NOTE:: NORMAL
SL AMB SPECIMEN ADEQUACY: NORMAL
SL AMB TEST METHODOLOGY: NORMAL

## 2023-10-19 NOTE — PROGRESS NOTES
MODESTO referred for initial prenatal assessment. Patient is Edward Rose with an GYPSY of 5/16/24. SW called patient today to complete assessment, no answer. SW left  requesting a return call. Patient's next appointment is scheduled for DOMONIQUE Carranza on 11/24/23 at 11:20AM.     Addendum-    Patient called SW back and left  stating she would be available after work at 2:15PM. Luba Jung called patient back at requested time. Patient reports she and FOB ( Юлия Ndiaye) are excited for pregnancy. They have told their parents and a few coworkers, who are all supportive. Patient and FOB live in [place]; they just bought their house last November. They both drive. 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 Plans to be on maternity leave and then go into summer break so she has time with baby before next school year. She denies current financial concerns or need for WIC/SNAP/MA information, parenting education, or baby supply resources. Patient denies current or h/o mental health (did see free therapist through work for 12 weeks, found it helpful but does not need therapy now), DV/IPV, CYS involvement, and legal concerns. Did indicate h/o recreational THC use - has not used since discovering pregnancy and does not plan to. Denies any other substance use. She reports good support from family and her best friend. Enjoys watching movies, relaxing, and cooking for stress relief. No other needs identified at this time, SW closing referral. Please re-refer as needed.

## 2023-11-01 ENCOUNTER — TELEPHONE (OUTPATIENT)
Dept: OBGYN CLINIC | Facility: CLINIC | Age: 28
End: 2023-11-01
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
## 2023-11-04 LAB
EXTERNAL ANTIBODY SCREEN: NORMAL
EXTERNAL HEMOGLOBIN: 11 G/DL
EXTERNAL HEPATITIS B SURFACE ANTIGEN: NEGATIVE
EXTERNAL HIV-1/2 AB-AG: NORMAL
EXTERNAL PLATELET COUNT: 364 K/ÂΜL
EXTERNAL RH FACTOR: POSITIVE
EXTERNAL RUBELLA IGG QUANTITATION: NORMAL
EXTERNAL SYPHILIS TOTAL IGG/IGM SCREENING: NORMAL

## 2023-11-07 ENCOUNTER — ROUTINE PRENATAL (OUTPATIENT)
Dept: PERINATAL CARE | Facility: OTHER | Age: 28
End: 2023-11-07
Payer: COMMERCIAL

## 2023-11-07 VITALS
WEIGHT: 162.6 LBS | HEART RATE: 109 BPM | SYSTOLIC BLOOD PRESSURE: 120 MMHG | BODY MASS INDEX: 27.09 KG/M2 | DIASTOLIC BLOOD PRESSURE: 66 MMHG | HEIGHT: 65 IN

## 2023-11-07 DIAGNOSIS — Z36.9 UNSPECIFIED ANTENATAL SCREENING: ICD-10-CM

## 2023-11-07 DIAGNOSIS — Z36.82 ENCOUNTER FOR NUCHAL TRANSLUCENCY TESTING: ICD-10-CM

## 2023-11-07 DIAGNOSIS — Z3A.12 12 WEEKS GESTATION OF PREGNANCY: ICD-10-CM

## 2023-11-07 DIAGNOSIS — Z33.1 PREGNANT STATE, INCIDENTAL: ICD-10-CM

## 2023-11-07 DIAGNOSIS — O36.80X0 ENCOUNTER TO DETERMINE FETAL VIABILITY OF PREGNANCY, SINGLE OR UNSPECIFIED FETUS: ICD-10-CM

## 2023-11-07 DIAGNOSIS — Z36.89 ENCOUNTER FOR OTHER SPECIFIED ANTENATAL SCREENING: ICD-10-CM

## 2023-11-07 DIAGNOSIS — O99.119 LOW MEAN CORPUSCULAR VOLUME (MCV) DURING PREGNANCY: Primary | ICD-10-CM

## 2023-11-07 DIAGNOSIS — D75.89 LOW MEAN CORPUSCULAR VOLUME (MCV) DURING PREGNANCY: Primary | ICD-10-CM

## 2023-11-07 DIAGNOSIS — F12.91 HISTORY OF MARIJUANA USE: ICD-10-CM

## 2023-11-07 LAB
ABO GROUP BLD: ABNORMAL
AMPHETAMINES UR QL SCN: NEGATIVE NG/ML
APPEARANCE UR: CLEAR
BACTERIA UR CULT: ABNORMAL
BACTERIA UR CULT: NO GROWTH
BACTERIA URNS QL MICRO: ABNORMAL
BARBITURATES UR QL SCN: NEGATIVE NG/ML
BASOPHILS # BLD AUTO: 0.1 X10E3/UL (ref 0–0.2)
BASOPHILS NFR BLD AUTO: 1 %
BENZODIAZ UR QL: NEGATIVE NG/ML
BILIRUB UR QL STRIP: NEGATIVE
BLD GP AB SCN SERPL QL: NEGATIVE
BZE UR QL: NEGATIVE NG/ML
C TRACH RRNA SPEC QL NAA+PROBE: NEGATIVE
CANNABINOIDS UR QL SCN: NEGATIVE NG/ML
CASTS URNS QL MICRO: ABNORMAL /LPF
CITATION REF LAB TEST: NORMAL
COLOR UR: YELLOW
EOSINOPHIL # BLD AUTO: 0.1 X10E3/UL (ref 0–0.4)
EOSINOPHIL NFR BLD AUTO: 1 %
EPI CELLS #/AREA URNS HPF: >10 /HPF (ref 0–10)
ERYTHROCYTE [DISTWIDTH] IN BLOOD BY AUTOMATED COUNT: 16.3 % (ref 11.7–15.4)
ETHANOL UR-MCNC: NEGATIVE %
ETHNIC BACKGROUND STATED: NORMAL
GENE DIS ANL CARRIER INTERP-IMP: NORMAL
GENE STUDIED ID: NORMAL
GLUCOSE 1H P 50 G GLC PO SERPL-MCNC: 91 MG/DL (ref 70–139)
GLUCOSE UR QL: NEGATIVE
HBV SURFACE AG SERPL QL IA: NEGATIVE
HCT VFR BLD AUTO: 35.8 % (ref 34–46.6)
HCV AB S/CO SERPL IA: NON REACTIVE
HGB BLD-MCNC: 11 G/DL (ref 11.1–15.9)
HGB UR QL STRIP: NEGATIVE
HIV 1+2 AB+HIV1 P24 AG SERPL QL IA: NON REACTIVE
IMM GRANULOCYTES # BLD: 0 X10E3/UL (ref 0–0.1)
IMM GRANULOCYTES NFR BLD: 0 %
KETONES UR QL STRIP: NEGATIVE
LABORATORY COMMENT REPORT: NORMAL
LEUKOCYTE ESTERASE UR QL STRIP: ABNORMAL
LYMPHOCYTES # BLD AUTO: 2.1 X10E3/UL (ref 0.7–3.1)
LYMPHOCYTES NFR BLD AUTO: 22 %
Lab: NORMAL
Lab: NORMAL
MCH RBC QN AUTO: 20.4 PG (ref 26.6–33)
MCHC RBC AUTO-ENTMCNC: 30.7 G/DL (ref 31.5–35.7)
MCV RBC AUTO: 66 FL (ref 79–97)
MICRO URNS: ABNORMAL
MONOCYTES # BLD AUTO: 0.8 X10E3/UL (ref 0.1–0.9)
MONOCYTES NFR BLD AUTO: 9 %
N GONORRHOEA RRNA SPEC QL NAA+PROBE: NEGATIVE
NEUTROPHILS # BLD AUTO: 6.2 X10E3/UL (ref 1.4–7)
NEUTROPHILS NFR BLD AUTO: 67 %
NITRITE UR QL STRIP: NEGATIVE
OPIATES UR QL: NEGATIVE NG/ML
PCP UR QL: NEGATIVE NG/ML
PH UR STRIP: 6.5 [PH] (ref 5–7.5)
PLATELET # BLD AUTO: 364 X10E3/UL (ref 150–450)
PROT UR QL STRIP: NEGATIVE
RBC # BLD AUTO: 5.4 X10E6/UL (ref 3.77–5.28)
RBC #/AREA URNS HPF: ABNORMAL /HPF (ref 0–2)
REASON FOR REFERRAL (NARRATIVE): NORMAL
RECOMMENDATION PATIENT DOC-IMP: NORMAL
REF LAB TEST METHOD: NORMAL
RH BLD: POSITIVE
RPR SER QL: NON REACTIVE
RUBV IGG SERPL IA-ACNC: 3 INDEX
SL AMB INTERPRETATION: NORMAL
SMN1 GENE MUT ANL BLD/T: NORMAL
SP GR UR: 1.02 (ref 1–1.03)
SPECIMEN PREPARATION: NORMAL
UROBILINOGEN UR STRIP-ACNC: 0.2 MG/DL (ref 0.2–1)
WBC # BLD AUTO: 9.3 X10E3/UL (ref 3.4–10.8)
WBC #/AREA URNS HPF: ABNORMAL /HPF (ref 0–5)

## 2023-11-07 PROCEDURE — 76813 OB US NUCHAL MEAS 1 GEST: CPT | Performed by: OBSTETRICS & GYNECOLOGY

## 2023-11-07 PROCEDURE — 36415 COLL VENOUS BLD VENIPUNCTURE: CPT | Performed by: OBSTETRICS & GYNECOLOGY

## 2023-11-07 PROCEDURE — 99203 OFFICE O/P NEW LOW 30 MIN: CPT | Performed by: OBSTETRICS & GYNECOLOGY

## 2023-11-07 PROCEDURE — 76801 OB US < 14 WKS SINGLE FETUS: CPT | Performed by: OBSTETRICS & GYNECOLOGY

## 2023-11-07 NOTE — PROGRESS NOTES
Paula Trevino presents today for a genetic screening ultrasound. This is her first pregnancy. She has no significant medical or surgical history other than wrist surgery in 2019. Her substance use history is currently unremarkable although she utilized marijuana prior to pregnancy. Her family history is unremarkable. A review of systems is otherwise negative. We discussed the options for genetic screening, including but not limited to first trimester screening, second trimester screening, combined first and second trimester screening, noninvasive prenatal screening (NIPS) for patients at high risk and diagnostic screening through the use of CVS and amniocentesis. We discussed the risks and benefits of each approach including the sensitivities and false positive rates as well as the difference between a screening test and a diagnostic test. At the conclusion of our discussion the patient elected noninvasive prenatal testing utilizing the [facility] Non-invasive prenatal screening (NIPS) test. The patient had this blood work drawn in the office and the results should be available approximately 7-10 days after her blood draw. Her results will be reported from [facility]. I reviewed the patient's blood work at her request and she has a low MCV with normal hemoglobin. This may be indicative of alpha or beta thalassemia. I provided her with a requisition to complete a hemoglobin electrophoresis to help elucidate her low MCV    We discussed follow-up in detail and I recommend an anatomy ultrasound be scheduled for 20 weeks gestation. Thank you very much for allowing us to participate in the care of this very nice patient. Should you have any questions, please do not hesitate to contact me. Portions of the record may have been created with voice recognition software. Occasional wrong word or "sound a like" substitutions may have occurred due to the inherent limitations of voice recognition software.  Read the chart carefully and recognize, using context, where substitutions have occurred.

## 2023-11-07 NOTE — LETTER
November 7, 2023     Colleen Carrillo MD  [address] [place]  [address] [facility]    Patient: Amber Alfaro   YOB: 1995   Date of Visit: 11/7/2023       Dear Dr. Luanne Yip:    Thank you for referring Amber Alfaro to me for evaluation. Below are my notes for this consultation. If you have questions, please do not hesitate to call me. I look forward to following your patient along with you. Sincerely,        Michelle Lopez MD        CC: No Recipients    Michelle Lopez MD  11/7/2023  1:38 PM  Sign when Signing Visit  Briana Torres presents today for a genetic screening ultrasound. This is her first pregnancy. She has no significant medical or surgical history other than wrist surgery in 2019. Her substance use history is currently unremarkable although she utilized marijuana prior to pregnancy. Her family history is unremarkable. A review of systems is otherwise negative. We discussed the options for genetic screening, including but not limited to first trimester screening, second trimester screening, combined first and second trimester screening, noninvasive prenatal screening (NIPS) for patients at high risk and diagnostic screening through the use of CVS and amniocentesis. We discussed the risks and benefits of each approach including the sensitivities and false positive rates as well as the difference between a screening test and a diagnostic test. At the conclusion of our discussion the patient elected noninvasive prenatal testing utilizing the [facility] Non-invasive prenatal screening (NIPS) test. The patient had this blood work drawn in the office and the results should be available approximately 7-10 days after her blood draw. Her results will be reported from [facility]. I reviewed the patient's blood work at her request and she has a low MCV with normal hemoglobin. This may be indicative of alpha or beta thalassemia.   I provided her with a requisition to complete a hemoglobin electrophoresis to help elucidate her low MCV    We discussed follow-up in detail and I recommend an anatomy ultrasound be scheduled for 20 weeks gestation. Thank you very much for allowing us to participate in the care of this very nice patient. Should you have any questions, please do not hesitate to contact me. Portions of the record may have been created with voice recognition software. Occasional wrong word or "sound a like" substitutions may have occurred due to the inherent limitations of voice recognition software. Read the chart carefully and recognize, using context, where substitutions have occurred.

## 2023-11-07 NOTE — PROGRESS NOTES
Patient chose to have [facility] Non-invasive Prenatal Screen with fetal sex. Patient choose billed through insurance. Patient assistance program (PAP) application provided to patient no. PAP sent with specimen No.     Patient given brochure and is aware [facility] will contact their insurance and coordinate coverage. Patient made aware she will receive a text message and e-mail from [employer]. Patient informed text/phone call message will come from area code  "415". Provided [employer] # [phone] and web site at [email or web address].   "[facility]istant your test online" card with barcode and test tube ID provided to patient. Reviewed [facility] web site states 5-7 business days for results via their portal.   [email or web address] message will be sent to patient when [facility] receives results /provider reviews. 2 vials of blood drawn from  left  arm by [facility] JOSE - CARMEN SHAH RN. Patient tolerated blood draw without difficulty. Specimens labeled with patient identifiers (name, date of birth, specimen collection date), order and specimen were verified with patient, packed and sent via [facility]. [employer]  tracking #  [id number]  Copy of lab order scanned to Epic media. Maternal Fetal Medicine will have results in approximately 7-10 business days and will call patient or notify via [address] [place] [address]. Patient aware viewing lab result online will reveal fetal sex if ordered. Patient verbalized understanding of all instructions and no questions at this time.

## 2023-11-23 PROBLEM — Z3A.12 12 WEEKS GESTATION OF PREGNANCY: Status: RESOLVED | Noted: 2023-10-16 | Resolved: 2023-11-23

## 2023-11-23 PROBLEM — Z3A.15 15 WEEKS GESTATION OF PREGNANCY: Status: ACTIVE | Noted: 2023-10-16

## 2023-11-24 ENCOUNTER — ROUTINE PRENATAL (OUTPATIENT)
Dept: OBGYN CLINIC | Facility: CLINIC | Age: 28
End: 2023-11-24
Payer: COMMERCIAL

## 2023-11-24 VITALS — BODY MASS INDEX: 27.55 KG/M2 | DIASTOLIC BLOOD PRESSURE: 62 MMHG | WEIGHT: 163 LBS | SYSTOLIC BLOOD PRESSURE: 102 MMHG

## 2023-11-24 DIAGNOSIS — D75.89 LOW MEAN CORPUSCULAR VOLUME (MCV) DURING PREGNANCY: Primary | ICD-10-CM

## 2023-11-24 DIAGNOSIS — Z3A.15 15 WEEKS GESTATION OF PREGNANCY: ICD-10-CM

## 2023-11-24 DIAGNOSIS — O99.119 LOW MEAN CORPUSCULAR VOLUME (MCV) DURING PREGNANCY: Primary | ICD-10-CM

## 2023-11-24 DIAGNOSIS — Z36.1 NEED FOR MATERNAL SERUM ALPHA-PROTEIN (MSAFP) SCREENING: ICD-10-CM

## 2023-11-24 LAB
SL AMB  POCT GLUCOSE, UA: NEGATIVE
SL AMB POCT URINE PROTEIN: NEGATIVE

## 2023-11-24 PROCEDURE — PNV: Performed by: OBSTETRICS & GYNECOLOGY

## 2023-11-24 PROCEDURE — 81002 URINALYSIS NONAUTO W/O SCOPE: CPT | Performed by: OBSTETRICS & GYNECOLOGY

## 2023-11-24 NOTE — PROGRESS NOTES
Routine Prenatal Visit  [address] [place] [address], [address], [facility], [address],8W    Assessment/Plan:  May Vizcarra is a 33y.o. year old  at 13w4d who presents for routine prenatal visit. 1. Low mean corpuscular volume (MCV) during pregnancy  Assessment & Plan:  10/2023 (first trimester) - hgb 11.0g/dl, MCV low. MFM recom hemoglobin electrophoresis and they ordered for [facility]. Pt goes to [address] [place] [address] usually and has MSAFP order for [facility] - so I provided order for [address] [facility] today. Orders:  -     Hgb Fractionation Cascade; Future  -     Hgb Fractionation Cascade    2. Need for maternal serum alpha-protein (MSAFP) screening  -     Alpha fetoprotein, maternal; Future  -     Alpha fetoprotein, maternal    3. 15 weeks gestation of pregnancy  -     POCT urine dip        Next OB Visit 4 weeks. Subjective:     CC: Prenatal care    Tavon Almendarez is a 33 y.o.  female who presents for routine prenatal care at 15w1d. Pregnancy ROS: no leakage of fluid, pelvic pain, or vaginal bleeding. normal fetal movement.     The following portions of the patient's history were reviewed and updated as appropriate: allergies, current medications, past family history, past medical history, obstetric history, gynecologic history, past social history, past surgical history and problem list.      Objective:  /62   Wt 73.9 kg (163 lb)   LMP 08/10/2023 (Exact Date)   BMI 27.55 kg/m²   Pregravid Weight/BMI: 70.3 kg (155 lb) (BMI 26.20)  Current Weight: 73.9 kg (163 lb)   Total Weight Gain: 3.629 kg (8 lb)   Pre-Liz Vitals      Flowsheet Row Most Recent Value   Prenatal Assessment    Fetal Heart Rate 155   Fundal Height (cm) 15 cm   Movement Absent   Prenatal Vitals    Blood Pressure 102/62   Weight - Scale 73.9 kg (163 lb)   Urine Albumin/Glucose    Dilation/Effacement/Station    Vaginal Drainage    Edema    LLE Edema None   RLE Edema None             General: Well appearing, no distress  Abdomen: Soft, gravid, nontender  Extremities: Non tender.

## 2023-11-24 NOTE — ASSESSMENT & PLAN NOTE
10/2023 (first trimester) - hgb 11.0g/dl, MCV low. MFM recom hemoglobin electrophoresis and they ordered for [facility] lab. Pt goes to [address] [place] [address] usually and has [facility] order for [facility] - so I provided order for [address] [place] [address] today.

## 2023-12-21 ENCOUNTER — ROUTINE PRENATAL (OUTPATIENT)
Dept: OBGYN CLINIC | Facility: CLINIC | Age: 28
End: 2023-12-21
Payer: COMMERCIAL

## 2023-12-21 VITALS
WEIGHT: 167.6 LBS | DIASTOLIC BLOOD PRESSURE: 60 MMHG | HEIGHT: 65 IN | SYSTOLIC BLOOD PRESSURE: 108 MMHG | BODY MASS INDEX: 27.92 KG/M2

## 2023-12-21 DIAGNOSIS — F12.91 HISTORY OF MARIJUANA USE: ICD-10-CM

## 2023-12-21 DIAGNOSIS — O99.119 LOW MEAN CORPUSCULAR VOLUME (MCV) DURING PREGNANCY: ICD-10-CM

## 2023-12-21 DIAGNOSIS — Z3A.19 19 WEEKS GESTATION OF PREGNANCY: Primary | ICD-10-CM

## 2023-12-21 DIAGNOSIS — Z84.89 FAMILY HISTORY OF GENETIC DISORDER: ICD-10-CM

## 2023-12-21 DIAGNOSIS — L65.9 HAIR LOSS: ICD-10-CM

## 2023-12-21 DIAGNOSIS — D75.89 LOW MEAN CORPUSCULAR VOLUME (MCV) DURING PREGNANCY: ICD-10-CM

## 2023-12-21 LAB
SL AMB  POCT GLUCOSE, UA: NEGATIVE
SL AMB POCT URINE PROTEIN: NEGATIVE

## 2023-12-21 PROCEDURE — PNV: Performed by: OBSTETRICS & GYNECOLOGY

## 2023-12-21 PROCEDURE — 81002 URINALYSIS NONAUTO W/O SCOPE: CPT | Performed by: OBSTETRICS & GYNECOLOGY

## 2023-12-21 NOTE — PROGRESS NOTES
"Routine Prenatal Visit  [facility] [place]  [address], [address], [place] [address]    Assessment/Plan:  Kassie is a 28 y.o. year old  at 19w0d who presents for routine prenatal visit.     1. 19 weeks gestation of pregnancy  -     POCT urine dip    2. Family history of genetic disorder    3. History of marijuana use    4. Hair loss  -     TSH + Free T4; Future    5. Low mean corpuscular volume (MCV) during pregnancy    No movement , no bleeding.   Lab to be done  this week.     anatomy scan.        Subjective:     CC: Prenatal care    Kassie Moya is a 28 y.o.  female who presents for routine prenatal care at 19w0d.  Pregnancy ROS: no  leakage of fluid, pelvic pain, or vaginal bleeding.  No  fetal movement.    The following portions of the patient's history were reviewed and updated as appropriate: allergies, current medications, past family history, past medical history, obstetric history, gynecologic history, past social history, past surgical history and problem list.      Objective:  /60   Ht 5' 5\" (1.651 m)   Wt 76 kg (167 lb 9.6 oz)   LMP 08/10/2023 (Exact Date)   BMI 27.89 kg/m²   Pregravid Weight/BMI: 70.3 kg (155 lb) (BMI 25.79)  Current Weight: 76 kg (167 lb 9.6 oz)   Total Weight Gain: 5.715 kg (12 lb 9.6 oz)   Pre-Liz Vitals    Flowsheet Row Most Recent Value   Prenatal Assessment    Fetal Heart Rate 158-162   Fundal Height (cm) 19 cm   Movement Absent   Prenatal Vitals    Blood Pressure 108/60   Weight - Scale 76 kg (167 lb 9.6 oz)   Urine Albumin/Glucose    Dilation/Effacement/Station    Vaginal Drainage    Draining Fluid No   Edema    LLE Edema None   RLE Edema None   Facial Edema None           General: Well appearing, no distress  Respiratory: Unlabored breathing  Cardiovascular: Regular rate.  Abdomen: Soft, gravid, nontender  Fundal Height: Appropriate for gestational age.  Extremities: Warm and well perfused.  Non tender.  "

## 2023-12-28 LAB
2ND TRIMESTER 4 SCREEN SERPL-IMP: NORMAL
AFP ADJ MOM SERPL: 1.08
AFP INTERP AMN-IMP: NORMAL
AFP INTERP SERPL-IMP: NORMAL
AFP INTERP SERPL-IMP: NORMAL
AFP SERPL-MCNC: 56.2 NG/ML
AGE AT DELIVERY: 28.1 YR
GA METHOD: NORMAL
GA: 19.7 WEEKS
HGB A MFR BLD: 5.4 % (ref 1.8–3.2)
HGB A MFR BLD: 94.6 % (ref 96.4–98.8)
HGB F MFR BLD: 0 % (ref 0–2)
HGB FRACT BLD-IMP: ABNORMAL
HGB S MFR BLD: 0 %
IDDM PATIENT QL: NO
MULTIPLE PREGNANCY: NO
NEURAL TUBE DEFECT RISK FETUS: 9540 %

## 2024-01-01 LAB
T4 FREE SERPL DIALY-MCNC: 0.94 NG/DL
TSH SERPL-ACNC: 1.3 UU/ML

## 2024-01-02 DIAGNOSIS — D56.3 BETA THALASSEMIA MINOR: Primary | ICD-10-CM

## 2024-01-05 PROBLEM — Z31.69 ENCOUNTER FOR PRECONCEPTION CONSULTATION: Status: RESOLVED | Noted: 2023-09-19 | Resolved: 2024-01-05

## 2024-01-08 ENCOUNTER — ROUTINE PRENATAL (OUTPATIENT)
Dept: PERINATAL CARE | Facility: OTHER | Age: 29
End: 2024-01-08
Payer: COMMERCIAL

## 2024-01-08 VITALS
BODY MASS INDEX: 28.42 KG/M2 | HEIGHT: 65 IN | SYSTOLIC BLOOD PRESSURE: 110 MMHG | HEART RATE: 96 BPM | WEIGHT: 170.6 LBS | DIASTOLIC BLOOD PRESSURE: 62 MMHG

## 2024-01-08 DIAGNOSIS — D56.3 BETA THALASSEMIA MINOR: ICD-10-CM

## 2024-01-08 DIAGNOSIS — Z3A.21 21 WEEKS GESTATION OF PREGNANCY: ICD-10-CM

## 2024-01-08 DIAGNOSIS — Z36.3 ENCOUNTER FOR ANTENATAL SCREENING FOR MALFORMATIONS: Primary | ICD-10-CM

## 2024-01-08 DIAGNOSIS — Z36.86 ENCOUNTER FOR ANTENATAL SCREENING FOR CERVICAL LENGTH: ICD-10-CM

## 2024-01-08 PROCEDURE — 76805 OB US >/= 14 WKS SNGL FETUS: CPT | Performed by: OBSTETRICS & GYNECOLOGY

## 2024-01-08 PROCEDURE — 76817 TRANSVAGINAL US OBSTETRIC: CPT | Performed by: OBSTETRICS & GYNECOLOGY

## 2024-01-08 PROCEDURE — 99213 OFFICE O/P EST LOW 20 MIN: CPT | Performed by: NURSE PRACTITIONER

## 2024-01-08 NOTE — LETTER
2024     Ammy Pardo MD  [address]  [address]  [facility] [address]    Patient: Kassie Moya   YOB: 1995   Date of Visit: 2024       Dear Dr. Pardo:    Thank you for referring Kassie Moya to me for evaluation. Below are my notes for this consultation.    If you have questions, please do not hesitate to call me. I look forward to following your patient along with you.         Sincerely,        Elizabeth Mcleod MD        CC: No Recipients    Elizabeth Mcleod MD  2024  4:18 PM  Sign when Signing Visit  Kassie Moya  has no complaints today. She is here at 21w4d  for detailed anatomic survey. She reports fetal movements and does not report any vaginal bleeding or signs of labor.  Her recently completed fetal testing revealed a low risk NIPT and negative MSAFP screen (MoM 1.08).     Problem list:  1. Beta Thalassemia Minor     Ultrasound findings:      A viable soni intrauterine pregnancy is seen on today's ultrasound, measuring consistent with established EDC.  The fetal anatomic survey is incomplete today, with suboptimal/incomplete views as indicated above.  However, no fetal anomalies are suspected on today's survey. Amniotic fluid and placenta are within normal limits. Transvaginal cervical length is within normal limits (3,54 cm), indicating low risk for  birth.     Pregnancy ultrasound has limitations and is unable to detect all forms of fetal congenital abnormalities.       Specific counseling was provided on the following problems:  1.  We discussed her beta thalassemia minor diagnosis.  She is aware genetic counseling is recommended for her partner.  She states someone was supposed to call her to schedule.  She was advised we could schedule a virtual visit when she checks out today.     Follow up recommended:   1.  Ultrasound for fetal growth and to complete the anatomy scan at 32 weeks.     Split-shared decision-making between CHARY Gordon  and myself was utilized, with the majority of the time spent by JAMAR Gordon.  Medical decision-making for this encounter was low  (diagnosis low, data moderate and risk low).     Procedures that were completed today were charged separately.     I reviewed the ultrasound pictures and recommended the medical decision making transcribed in the care of this patient.         Elizabeth Mcleod MD

## 2024-01-08 NOTE — PROGRESS NOTES
Kassie Moya  has no complaints today. She is here at 21w4d  for detailed anatomic survey. She reports fetal movements and does not report any vaginal bleeding or signs of labor.  Her recently completed fetal testing revealed a low risk NIPT and negative MSAFP screen (MoM 1.08).     Problem list:  1. Beta Thalassemia Minor     Ultrasound findings:      A viable soni intrauterine pregnancy is seen on today's ultrasound, measuring consistent with established EDC.  The fetal anatomic survey is incomplete today, with suboptimal/incomplete views as indicated above.  However, no fetal anomalies are suspected on today's survey. Amniotic fluid and placenta are within normal limits. Transvaginal cervical length is within normal limits (3,54 cm), indicating low risk for  birth.     Pregnancy ultrasound has limitations and is unable to detect all forms of fetal congenital abnormalities.       Specific counseling was provided on the following problems:  1.  We discussed her beta thalassemia minor diagnosis.  She is aware genetic counseling is recommended for her partner.  She states someone was supposed to call her to schedule.  She was advised we could schedule a virtual visit when she checks out today.     Follow up recommended:   1.  Ultrasound for fetal growth and to complete the anatomy scan at 32 weeks.     Split-shared decision-making between CHARY Gordon and myself was utilized, with the majority of the time spent by JAMAR Gordon.  Medical decision-making for this encounter was low  (diagnosis low, data moderate and risk low).     Procedures that were completed today were charged separately.     I reviewed the ultrasound pictures and recommended the medical decision making transcribed in the care of this patient.         Elizabeth Mcleod MD

## 2024-01-09 ENCOUNTER — TELEPHONE (OUTPATIENT)
Age: 29
End: 2024-01-09

## 2024-01-09 NOTE — TELEPHONE ENCOUNTER
Lvm 1/9 @ 10 am for patient to call the office to schedule f/u appointments from her visit yesterday at our [place] office. Will need level 1 ultrasound around 4/1/24 for a growth and complete the anatomy scan and also a VV with the genetic counselor.

## 2024-01-12 ENCOUNTER — TELEMEDICINE (OUTPATIENT)
Facility: HOSPITAL | Age: 29
End: 2024-01-12

## 2024-01-12 DIAGNOSIS — Z82.79 FAMILY HISTORY OF CONGENITAL OR GENETIC CONDITION: ICD-10-CM

## 2024-01-12 DIAGNOSIS — Z31.5 ENCOUNTER FOR PROCREATIVE GENETIC COUNSELING: ICD-10-CM

## 2024-01-12 DIAGNOSIS — O28.1 ABNORMAL BIOCHEMICAL FINDING ON ANTENATAL SCREENING OF MOTHER: Primary | ICD-10-CM

## 2024-01-12 PROCEDURE — NC001 PR NO CHARGE

## 2024-01-15 NOTE — PROGRESS NOTES
Genetic Counseling Note    Appointment Date:  2024  Referred By: Lisa Salinas*  YOB: 1995  Partner:  Marknetta Ruiz  Indication for Visit:   beta thalassemia carrier  Pregnancy History:   Estimated Date of Delivery: 2024  Estimated Gestational Age: 22w1d        Virtual Regular Visit    Verification of patient location:    Patient is located at Home in the following state in which I hold an active license [place]      Assessment/Plan:    Problem List Items Addressed This Visit    None  Visit Diagnoses       Abnormal biochemical finding on  screening of mother    -  Primary    Family history of congenital or genetic condition        Encounter for procreative genetic counseling                     Reason for visit is   Chief Complaint   Patient presents with    Virtual Regular Visit          Encounter provider Alisia Orozco    Provider located at [address]  [place] [address]      Recent Visits  Date Type Provider Dept   24 Telemedicine Alisia Orozco An    Showing recent visits within past 7 days and meeting all other requirements  Future Appointments  No visits were found meeting these conditions.  Showing future appointments within next 150 days and meeting all other requirements       The patient was identified by name and date of birth. Kassie Moya was informed that this is a telemedicine visit and that the visit is being conducted through the Epic Embedded platform. She agrees to proceed. My office door was closed. No one else was in the room. She acknowledged consent and understanding of privacy and security of the video platform. The patient has agreed to participate and understands they can discontinue the visit at any time.    Kassie is a 28 y.o. female who presented with her partner Mark for genetic counseling to discuss her positive carrier screen  for beta thalassemia.    Routine prenatal lab work for Kassie found microcytic anemia on 23, with an MCV of 66 fL and MCH of 20.4 pg. Hemoglobin electrophoresis was ordered at her first trimester ultrasound to investigate the possibility of hemoglobinopathy; this resulted on 23 showing 94.6% Hb A and 5.4% Hb A2. These results are consistent with beta thalassemia carrier status, or beta thalassemia minor.    We discussed that genetic conditions involving beta globin chains may involve a structural variant (e.g. Hb S/sickle cell), and/or a deficiency, known as thalassemia. We discussed the autosomal recessive nature of beta thalassemia and that affected individuals have two pathogenic HBB variants, while carriers have only one. We discussed the clinical spectrum of beta thalassemia, ranging from carrier status, to thalassemia intermedia, to thalassemia major. Historically, beta thalassemia major has been associated with severe morbidity and the need for lifelong medical treatment such as blood transfusions; while targeted treatments are now available, it is still a serious condition. Thalassemia intermedia is a milder but variable condition that may require treatment for manifestations such as anemia or skeletal changes. Thalassemia minor does not require medical intervention but may be clinically recognized based on anemia and the previously described red blood cell indices. Disease severity is largely determined by the particular HBB variant(s) present in the affected individual.    Should Mark be a carrier of a hemoglobinopathy, the pregnancy is at 25% risk to be affected and prenatal diagnosis such as amniocentesis is available. Yulan single gene NIPT is also available to provide a risk estimate for hemoglobinopathy in the fetus. We reviewed  screening in the [facility] for hemoglobinopathies. We discussed beta thalassemia carrier testing options for Mark. He has the option of  completing a hemoglobinopathy evaluation with complete blood count and hemoglobin electrophoresis. We also reviewed the availability of full HBB gene sequencing, both for Mark as a screen and for Kassie to determine the mutation in her HBB gene. We reviewed the option of expanded carrier screening. We discussed that the panels can test for carrier status for over 500 autosomal recessive and X-linked diseases, including hemoglobinopathy. All of the diseases included on the panel are life threatening, life limiting, or have treatments available.     After reviewing the benefits and limitations Kassie is interested in pursuing expanded carrier screening but would like to further consider her options. A [employer] message was sent with more information. I will place the orders for expanded carrier screening or HBB gene sequencing should either member of the couple desire it. In the meantime, Makr elected to pursue hemoglobinopathy evaluation following our appointment. [facility] Results take approximately 5-7 days and will be forwarded to your office. Kassie may be pursue Mousie single gene NIPT for hemoglobinopathy in the event Mark is a carrier.    During this pregnancy Kassie has had negative aneuploidy screening through [facility] NIPS and nuchal translucency ultrasound (NT = 1.5 mm), as well as a negative MS-AFP screen for open neural tube defects (1.08 MoM). We reviewed that level II anatomy ultrasound is typically performed at approximately 20 weeks gestation. Level II ultrasound evaluation is between 60-80% accurate in detecting major physical birth defects and variations in fetal development that may be associated with chromosome/genetic abnormalities. Level II ultrasound evaluation is not able to detect all birth defects or health problems.     Histories for the patient and her partner's family were taken during our session. Kassie reports a paternal first cousin  (father's brother's daughter), age 24, with spinal muscular atrophy (SMA). This individual is stated to have used a wheelchair since age 6 and to be paralyzed from the waist down; she may have had a surgical procedure involving her leg muscles due to atrophy. Kassie has had negative carrier screening for SMA which found two copies of SMN1 and no risk allele. Her a priori risk to be a carrier based on family history is 1/4, or 25%. After her negative carrier screen her residual risk to be a carrier is between 1.64% and 2.28% (1/61-1/44). I shared this information with Kassie in a [employer] message after her visit. Without Mark being tested the reproductive risk based on carrier frequency for his ethnicity is 1 in several thousand; even if he were found to be a carrier for SMA there would be a less than 1% risk of the condition in this pregnancy. However, carrier screening for him is available if desired.    Kassie's partner Mark reported that his father passed away at age 46 from a heart attack, thought to be due to high cholesterol levels. Mark's paternal grandfather passed away at age 55 from the same cause. Mark himself reports that he has had high cholesterol in the past but that he has not been checked in several years. We discussed that high cholesterol can run in families, either due to a multifactorial or monogenic etiology that can be identified through genetic testing. Knowledge of this predisposition can reduce the risk of morbidity and mortality due to elevated cholesterol through preventive management. I urged Mark to follow up with a primary care provider to check his cholesterol and relate his family history, to see if further follow-up is recommended.     Further review of family history for the patient and her partner was noncontributory. The family history was not significant for other genetic diseases or disorders, intellectual disability, birth defects, fetal loss, or  consanguinity.    Patient reports being of Chinese/Burundian/White  descent and that her  is of [place]ish descent. She denies either of them having known Ashkenazi Jehovah's witness ancestry. In addition to the previously described carrier screening for hemoglobinopathy and SMA, she has also completed negative carrier screening for cystic fibrosis.    Lastly, we discussed the fact that everyone in the general population regardless of age, family history, or medical background has approximately a 3-5% risk of having a child with some type of congenital anomaly, genetic disease or intellectual disability. Currently there are no tests available to rule out all birth defects or health problems.    Kassie was provided with our contact information.  I encouraged her to call with any questions or concerns.    Plan/Tests Ordered:  1) Patient declined Wyatt single gene NIPT at this time.   2) Patient's partner elected hemoglobin evaluation, orders placed through [facility].  3) Patient will consider other carrier screening options for herself and her partner.  4) Follow-up anatomy ultrasound scheduled on 3/29/2024 at [facility] site.    Time spent with Genetic Counselor: 39 minutes      HPI     Past Medical History:   Diagnosis Date    Concussion 03/2020    MRI was negative for torticy    Encounter for preconception consultation 09/19/2023    Last Assessment & Plan:    Formatting of this note might be different from the original.   6 weeks pregnant.   Beta-hcg today positive   No risk factors for complicated pregnancy   First pregnancy   Taking folate      - referred to ob for further care   - no concerns at today's visit   - recommended she obtain covid vaccine at future visit at pharmacy    Herpes August 2018    Vasodepressor syncope     between ages 16-18       Past Surgical History:   Procedure Laterality Date    CHALAZION EXCISION Right     right eye    WRIST FRACTURE SURGERY Right 02/2019    plates and screws        Current Outpatient Medications   Medication Sig Dispense Refill    Prenatal Vit-Fe Fumarate-FA (PRENATAL VITAMIN PO) Take by mouth      valACYclovir (VALTREX) 500 mg tablet Take 500 mg by mouth 2 (two) times a day       No current facility-administered medications for this visit.        Allergies   Allergen Reactions    Seasonal Ic [Octacosanol] Allergic Rhinitis    Pork-Derived Products - Food Allergy Rash       Review of Systems    Video Exam    There were no vitals filed for this visit.    Physical Exam     Visit Time  Total Visit Duration: 39 minutes

## 2024-01-18 ENCOUNTER — ROUTINE PRENATAL (OUTPATIENT)
Dept: OBGYN CLINIC | Facility: CLINIC | Age: 29
End: 2024-01-18
Payer: COMMERCIAL

## 2024-01-18 VITALS — WEIGHT: 172.6 LBS | DIASTOLIC BLOOD PRESSURE: 58 MMHG | BODY MASS INDEX: 28.72 KG/M2 | SYSTOLIC BLOOD PRESSURE: 106 MMHG

## 2024-01-18 DIAGNOSIS — O98.312 GENITAL HERPES AFFECTING PREGNANCY IN SECOND TRIMESTER: ICD-10-CM

## 2024-01-18 DIAGNOSIS — Z36.89 ENCOUNTER FOR OTHER SPECIFIED ANTENATAL SCREENING: ICD-10-CM

## 2024-01-18 DIAGNOSIS — Z3A.23 23 WEEKS GESTATION OF PREGNANCY: Primary | ICD-10-CM

## 2024-01-18 DIAGNOSIS — A60.09 GENITAL HERPES AFFECTING PREGNANCY IN SECOND TRIMESTER: ICD-10-CM

## 2024-01-18 DIAGNOSIS — D56.3 BETA THALASSEMIA MINOR: ICD-10-CM

## 2024-01-18 LAB
SL AMB  POCT GLUCOSE, UA: NORMAL
SL AMB POCT URINE PROTEIN: NORMAL

## 2024-01-18 PROCEDURE — PNV: Performed by: OBSTETRICS & GYNECOLOGY

## 2024-01-18 PROCEDURE — 81002 URINALYSIS NONAUTO W/O SCOPE: CPT | Performed by: OBSTETRICS & GYNECOLOGY

## 2024-01-18 NOTE — PROGRESS NOTES
Routine Prenatal Visit  [facility] [place]  [address], [address], [place] [address]    Assessment/Plan:  Kassie is a 28 y.o. year old  at 23w0d who presents for routine prenatal visit.     1. 23 weeks gestation of pregnancy  -     POCT urine dip    2. Encounter for other specified  screening  -     Glucose, 1H PG; Future  -     CBC; Future  -     RPR, Rfx Qn RPR/Confirm TP; Future    3. Genital herpes affecting pregnancy in second trimester    4. Beta thalassemia minor  Comments:  2024 genetic counseling  pending  labs            Subjective:     CC: Prenatal care    Kassie Moya is a 28 y.o.  female who presents for routine prenatal care at 23w0d.  Pregnancy ROS: no  leakage of fluid, pelvic pain, or vaginal bleeding.  + fetal movement.    The following portions of the patient's history were reviewed and updated as appropriate: allergies, current medications, past family history, past medical history, obstetric history, gynecologic history, past social history, past surgical history and problem list.      Objective:  /58   Wt 78.3 kg (172 lb 9.6 oz)   LMP 08/10/2023 (Exact Date)   BMI 28.72 kg/m²   Pregravid Weight/BMI: 70.3 kg (155 lb) (BMI 25.79)  Current Weight: 78.3 kg (172 lb 9.6 oz)   Total Weight Gain: 7.983 kg (17 lb 9.6 oz)   Pre-Liz Vitals    Flowsheet Row Most Recent Value   Prenatal Assessment    Fetal Heart Rate 152   Fundal Height (cm) 24 cm   Movement Present   Prenatal Vitals    Blood Pressure 106/58   Weight - Scale 78.3 kg (172 lb 9.6 oz)   Urine Albumin/Glucose    Dilation/Effacement/Station    Vaginal Drainage    Draining Fluid No   Edema    LLE Edema None   RLE Edema None   Facial Edema None           General: Well appearing, no distress  Respiratory: Unlabored breathing  Cardiovascular: Regular rate.  Abdomen: Soft, gravid, nontender  Fundal Height: Appropriate for gestational age.  Extremities: Warm and well perfused.  Non tender.

## 2024-01-18 NOTE — PATIENT INSTRUCTIONS
NUTRITION IN PREGNANCY  Good Nutrition is a VERY important part of having a healthy pregnancy and healthy baby.  You should follow a healthy diet which include the following:   * Vegetables (which are dark green and leafy): at least 2 servings each day   * Protein (meat, eggs, beans, nuts, peanut butter): 3-4 servings each day   * Breads/whole grains (bread, pasta, rice, tortillas, potatoes): 3 servings each day   * Dairy (milk, yogurt, cheese): 3-4 servings each day   * Water: 6-8 glasses per day   * Calories: approximately 2000 to 2200 calories per day     WEIGHT GAIN   Recommended weight gain for you during your pregnancy is based on your body mass index (BMI) at the time that you became pregnant.   Pre-pregnant BMI Recommended weight gain   Underweight (BMI less than 18.5) 28 to 40 pounds   Normal weight (BMI 18.5-24.9) 25 to 35 pounds   Overweight (BMI 25-29.9) 15 to 25 pounds   Obese (BMI 30 or greater) 11 to 20 pounds     FOOD SAFETY   It is VERY important to eat only safely-prepared foods during pregnancy as you and your baby have a higher risk than usual for being affected by foodborne illnesses.  Follow these steps to ensure that you and your baby are safe from foodborne illnesses while you are pregnant:   wash hands thoroughly with warm water and soap before and after handling any foods   wash cutting boards, dishes, utensils, and countertops with hot water and soap before and after preparing any foods   rinse raw fruits and vegetables thoroughly under running water before eating   keep raw meat and seafood separate from other foods and use different cutting boards/utensils to handle raw meat than for other foods   put cooked food on a freshly clean plate   cook all of your foods thoroughly   discard foods that have been left out for more than 2 hours   refrigerate or freeze any foods than can spoil     There are three particular foodborne risks that you should be aware of and avoid as they can cause  serious harm to your unborn child.     * Listeria (a harmful bacteria)   don’t eat hot dogs or deli meats (unless they’re reheated until steaming hot)   don’t eat soft cheeses (such as Feta, Brie, Camembert) unless they are specifically labeled as being “made with pasteurized milk”   don’t drink raw (unpasteurized) milk   don’t eat refrigerated pates or meat spreads   don’t eat refrigerated smoked seafood unless it’s in a cooked dish like a casserole     * Mercury (a metal which is found in certain fish in high levels)   don’t eat shark, tilefish, edgar mackerel, or swordfish   don’t eat more than 12 ounces per week of shrimp, salmon, pollock, or catfish   when eating tuna fish, you can have up to 6 ounces per week of canned albacore tuna OR up to 12 ounces of canned light tuna     * Toxoplasma (a harmful parasite)   cook meat thoroughly before eating   wear gloves when gardening or handling sand from a child’s sandbox   if you ha tve a cat, have someone else change the litter box while you are pregnant.    if you HAVE to clean it yourself, be sure to wash your hands thoroughly afterwards with warm water and soap.   don’t get a NEW cat while you are pregnant

## 2024-02-19 ENCOUNTER — ROUTINE PRENATAL (OUTPATIENT)
Dept: OBGYN CLINIC | Facility: CLINIC | Age: 29
End: 2024-02-19
Payer: COMMERCIAL

## 2024-02-19 VITALS
WEIGHT: 182 LBS | DIASTOLIC BLOOD PRESSURE: 70 MMHG | HEIGHT: 65 IN | SYSTOLIC BLOOD PRESSURE: 118 MMHG | BODY MASS INDEX: 30.32 KG/M2

## 2024-02-19 DIAGNOSIS — O98.312 GENITAL HERPES AFFECTING PREGNANCY IN SECOND TRIMESTER: Primary | ICD-10-CM

## 2024-02-19 DIAGNOSIS — Z84.89 FAMILY HISTORY OF GENETIC DISORDER: ICD-10-CM

## 2024-02-19 DIAGNOSIS — L29.9 ITCHING: ICD-10-CM

## 2024-02-19 DIAGNOSIS — Z3A.27 27 WEEKS GESTATION OF PREGNANCY: ICD-10-CM

## 2024-02-19 DIAGNOSIS — A60.09 GENITAL HERPES AFFECTING PREGNANCY IN SECOND TRIMESTER: Primary | ICD-10-CM

## 2024-02-19 DIAGNOSIS — F12.91 HISTORY OF MARIJUANA USE: ICD-10-CM

## 2024-02-19 LAB
SL AMB  POCT GLUCOSE, UA: NORMAL
SL AMB POCT URINE PROTEIN: NORMAL

## 2024-02-19 PROCEDURE — PNV: Performed by: OBSTETRICS & GYNECOLOGY

## 2024-02-19 PROCEDURE — 81002 URINALYSIS NONAUTO W/O SCOPE: CPT | Performed by: OBSTETRICS & GYNECOLOGY

## 2024-02-19 NOTE — PROGRESS NOTES
"Routine Prenatal Visit  [facility] [address], [place] [address]    Assessment/Plan:  Kassie is a 28 y.o. year old  at 27w4d who presents for routine prenatal visit.     1. Genital herpes affecting pregnancy in second trimester  Assessment & Plan:  Suppression at 36 weeks      2. Family history of genetic disorder  Assessment & Plan:  Reports  negative for all genetic testing, no further work up needed      3. History of marijuana use    4. 27 weeks gestation of pregnancy  Assessment & Plan:  Has labs scheduled for this week.     Orders:  -     POCT urine dip    5. Itching  -     Bile acids, total; Future  -     Comprehensive metabolic panel; Future  -     Bile acids, total  -     Comprehensive metabolic panel          Subjective:   Kassie Moya is a 28 y.o.  who presents for routine prenatal care at 27w4d.  Complaints today: Reports itching of hands and feet.    LOF: -; VB: -; Contractions: -; FM: +    Objective:  /70 (BP Location: Left arm, Patient Position: Sitting, Cuff Size: Standard)   Ht 5' 5\" (1.651 m)   Wt 82.6 kg (182 lb)   LMP 08/10/2023 (Exact Date)   BMI 30.29 kg/m²     General: Well appearing, no distress  Respiratory: Unlabored breathing  Abdomen: Soft, gravid, nontender  Extremities: Warm and well perfused.  Non tender.    Pregravid Weight/BMI: 70.3 kg (155 lb) (BMI 25.79)  Current Weight: 82.6 kg (182 lb)   Total Weight Gain: 12.2 kg (27 lb)     Pre-Liz Vitals      Flowsheet Row Most Recent Value   Prenatal Assessment    Prenatal Vitals    Blood Pressure 118/70   Weight - Scale 82.6 kg (182 lb)   Urine Albumin/Glucose    Dilation/Effacement/Station    Vaginal Drainage    Edema              Shil V DO [place]  2024 5:46 PM    "

## 2024-02-25 DIAGNOSIS — O99.013 ANEMIA DURING PREGNANCY IN THIRD TRIMESTER: Primary | ICD-10-CM

## 2024-02-25 LAB
BASOPHILS # BLD AUTO: 0 X10E3/UL (ref 0–0.2)
BASOPHILS NFR BLD AUTO: 0 %
EOSINOPHIL # BLD AUTO: 0.2 X10E3/UL (ref 0–0.4)
EOSINOPHIL NFR BLD AUTO: 2 %
ERYTHROCYTE [DISTWIDTH] IN BLOOD BY AUTOMATED COUNT: 16.5 % (ref 11.7–15.4)
GLUCOSE 1H P 50 G GLC PO SERPL-MCNC: 123 MG/DL (ref 70–139)
HCT VFR BLD AUTO: 32.8 % (ref 34–46.6)
HGB BLD-MCNC: 10.2 G/DL (ref 11.1–15.9)
IMM GRANULOCYTES # BLD: 0.1 X10E3/UL (ref 0–0.1)
IMM GRANULOCYTES NFR BLD: 1 %
LYMPHOCYTES # BLD AUTO: 2.2 X10E3/UL (ref 0.7–3.1)
LYMPHOCYTES NFR BLD AUTO: 20 %
MCH RBC QN AUTO: 21.2 PG (ref 26.6–33)
MCHC RBC AUTO-ENTMCNC: 31.1 G/DL (ref 31.5–35.7)
MCV RBC AUTO: 68 FL (ref 79–97)
MONOCYTES # BLD AUTO: 0.9 X10E3/UL (ref 0.1–0.9)
MONOCYTES NFR BLD AUTO: 8 %
NEUTROPHILS # BLD AUTO: 7.7 X10E3/UL (ref 1.4–7)
NEUTROPHILS NFR BLD AUTO: 69 %
PLATELET # BLD AUTO: 293 X10E3/UL (ref 150–450)
RBC # BLD AUTO: 4.81 X10E6/UL (ref 3.77–5.28)
RPR SER QL: NON REACTIVE
WBC # BLD AUTO: 11 X10E3/UL (ref 3.4–10.8)

## 2024-03-05 ENCOUNTER — ROUTINE PRENATAL (OUTPATIENT)
Dept: OBGYN CLINIC | Facility: CLINIC | Age: 29
End: 2024-03-05
Payer: COMMERCIAL

## 2024-03-05 VITALS
DIASTOLIC BLOOD PRESSURE: 62 MMHG | HEIGHT: 65 IN | BODY MASS INDEX: 30.82 KG/M2 | WEIGHT: 185 LBS | SYSTOLIC BLOOD PRESSURE: 108 MMHG

## 2024-03-05 DIAGNOSIS — O99.013 ANEMIA DURING PREGNANCY IN THIRD TRIMESTER: ICD-10-CM

## 2024-03-05 DIAGNOSIS — Z34.03 FIRST PREGNANCY, THIRD TRIMESTER: ICD-10-CM

## 2024-03-05 DIAGNOSIS — Z3A.29 29 WEEKS GESTATION OF PREGNANCY: Primary | ICD-10-CM

## 2024-03-05 DIAGNOSIS — Z23 NEED FOR TDAP VACCINATION: ICD-10-CM

## 2024-03-05 LAB
ALBUMIN SERPL-MCNC: 3.5 G/DL (ref 4–5)
ALBUMIN/GLOB SERPL: 1.5 {RATIO} (ref 1.2–2.2)
ALP SERPL-CCNC: 61 IU/L (ref 44–121)
ALT SERPL-CCNC: 17 IU/L (ref 0–32)
AST SERPL-CCNC: 22 IU/L (ref 0–40)
BILE AC SERPL-SCNC: 2.1 UMOL/L (ref 0–10)
BILIRUB SERPL-MCNC: 0.6 MG/DL (ref 0–1.2)
BUN SERPL-MCNC: 7 MG/DL (ref 6–20)
BUN/CREAT SERPL: 14 (ref 9–23)
CALCIUM SERPL-MCNC: 8.7 MG/DL (ref 8.7–10.2)
CHLORIDE SERPL-SCNC: 103 MMOL/L (ref 96–106)
CO2 SERPL-SCNC: 19 MMOL/L (ref 20–29)
CREAT SERPL-MCNC: 0.5 MG/DL (ref 0.57–1)
EGFR: 132 ML/MIN/1.73
GLOBULIN SER-MCNC: 2.3 G/DL (ref 1.5–4.5)
GLUCOSE SERPL-MCNC: 74 MG/DL (ref 70–99)
POTASSIUM SERPL-SCNC: 4.1 MMOL/L (ref 3.5–5.2)
PROT SERPL-MCNC: 5.8 G/DL (ref 6–8.5)
SL AMB  POCT GLUCOSE, UA: NORMAL
SL AMB POCT URINE PROTEIN: NORMAL
SODIUM SERPL-SCNC: 137 MMOL/L (ref 134–144)

## 2024-03-05 PROCEDURE — 90715 TDAP VACCINE 7 YRS/> IM: CPT | Performed by: NURSE PRACTITIONER

## 2024-03-05 PROCEDURE — 90471 IMMUNIZATION ADMIN: CPT | Performed by: NURSE PRACTITIONER

## 2024-03-05 PROCEDURE — PNV: Performed by: NURSE PRACTITIONER

## 2024-03-05 PROCEDURE — 81002 URINALYSIS NONAUTO W/O SCOPE: CPT | Performed by: NURSE PRACTITIONER

## 2024-03-05 RX ORDER — FERROUS SULFATE 325(65) MG
TABLET ORAL
COMMUNITY
Start: 2024-03-01

## 2024-03-05 NOTE — PATIENT INSTRUCTIONS
Reviewed [facility], S/S PTL, call with any concerns  Discussed leg cramps, reassured normal, can try increasing dietary potassium and phosphorus, OTC magnesium supplement, leg stretches before bed, ensure adequate hydration.   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  However, can see if we can ask for accommodations or restrictions in the work environment to ensure her safety.  Pt to consider what this might look like and will continue to discuss as needed.   Return visit in 2 weeks

## 2024-03-05 NOTE — PROGRESS NOTES
"Routine Prenatal Visit  [facility] - [place] [address], [address], [place] [address]    Assessment/Plan:  Kassie is a 28 y.o. year old  at 29w5d who presents for routine prenatal visit.   Reviewed FMC, S/S PTL, call with any concerns  Discussed leg cramps, reassured normal, can try increasing dietary potassium and phosphorus, OTC magnesium supplement, leg stretches before bed, ensure adequate hydration.   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  However, can see if we can ask for accommodations or restrictions in the work environment to ensure her safety.  Pt to consider what this might look like and will continue to discuss as needed.   Return visit in 2 weeks    1. 29 weeks gestation of pregnancy  -     POCT urine dip    2. First pregnancy, third trimester    3. Need for Tdap vaccination  -     TDAP VACCINE GREATER THAN OR EQUAL TO 8YO IM    4. Anemia during pregnancy in third trimester  Assessment & Plan:  Taking slow release iron          Next OB Visit 2 weeks.    Subjective:     CC: Prenatal care    Kassie Moya is a 28 y.o.  female who presents for routine prenatal care at 29w5d.  Pregnancy ROS: no leakage of fluid, pelvic pain, or vaginal bleeding.  normal fetal movement. Having leg cramps at night. Concerned with work,  K-2,  worried about kids hitting her in the belly as she gets bigger.      The following portions of the patient's history were reviewed and updated as appropriate: allergies, current medications, past family history, past medical history, obstetric history, gynecologic history, past social history, past surgical history and problem list.      Objective:  /62 (BP Location: Left arm, Patient Position: Sitting, Cuff Size: Standard)   Ht 5' 5\" (1.651 m)   Wt 83.9 kg (185 lb)   LMP 08/10/2023 (Exact Date)   BMI 30.79 " kg/m²   Pregravid Weight/BMI: 70.3 kg (155 lb) (BMI 25.79)  Current Weight: 83.9 kg (185 lb)   Total Weight Gain: 13.6 kg (30 lb)   Pre-Liz Vitals      Flowsheet Row Most Recent Value   Prenatal Assessment    Fetal Heart Rate 154   Fundal Height (cm) 29 cm   Movement Present   Prenatal Vitals    Blood Pressure 108/62   Weight - Scale 83.9 kg (185 lb)   Urine Albumin/Glucose    Dilation/Effacement/Station    Vaginal Drainage    Draining Fluid No   Edema    LLE Edema Trace   RLE Edema Trace             General: Well appearing, no distress  Abdomen: Soft, gravid, nontender  Extremities: Non tender.

## 2024-03-18 ENCOUNTER — NURSE TRIAGE (OUTPATIENT)
Age: 29
End: 2024-03-18

## 2024-03-18 ENCOUNTER — ROUTINE PRENATAL (OUTPATIENT)
Dept: OBGYN CLINIC | Facility: CLINIC | Age: 29
End: 2024-03-18
Payer: COMMERCIAL

## 2024-03-18 VITALS — BODY MASS INDEX: 30.99 KG/M2 | SYSTOLIC BLOOD PRESSURE: 112 MMHG | WEIGHT: 186.2 LBS | DIASTOLIC BLOOD PRESSURE: 70 MMHG

## 2024-03-18 DIAGNOSIS — A60.09 GENITAL HERPES AFFECTING PREGNANCY IN THIRD TRIMESTER: ICD-10-CM

## 2024-03-18 DIAGNOSIS — Z3A.31 31 WEEKS GESTATION OF PREGNANCY: ICD-10-CM

## 2024-03-18 DIAGNOSIS — R39.9 UTI SYMPTOMS: Primary | ICD-10-CM

## 2024-03-18 DIAGNOSIS — R39.15 URINARY URGENCY: Primary | ICD-10-CM

## 2024-03-18 DIAGNOSIS — F12.91 HISTORY OF MARIJUANA USE: ICD-10-CM

## 2024-03-18 DIAGNOSIS — Z84.89 FAMILY HISTORY OF GENETIC DISORDER: ICD-10-CM

## 2024-03-18 DIAGNOSIS — O98.313 GENITAL HERPES AFFECTING PREGNANCY IN THIRD TRIMESTER: ICD-10-CM

## 2024-03-18 DIAGNOSIS — D56.3 BETA THALASSEMIA MINOR: ICD-10-CM

## 2024-03-18 LAB
SL AMB  POCT GLUCOSE, UA: NEGATIVE
SL AMB LEUKOCYTE ESTERASE,UA: ABNORMAL
SL AMB POCT BILIRUBIN,UA: NEGATIVE
SL AMB POCT BLOOD,UA: NEGATIVE
SL AMB POCT CLARITY,UA: CLEAR
SL AMB POCT COLOR,UA: YELLOW
SL AMB POCT KETONES,UA: NEGATIVE
SL AMB POCT NITRITE,UA: NEGATIVE
SL AMB POCT PH,UA: 5
SL AMB POCT SPECIFIC GRAVITY,UA: 1.01
SL AMB POCT URINE PROTEIN: NEGATIVE
SL AMB POCT UROBILINOGEN: 0.2

## 2024-03-18 PROCEDURE — 81002 URINALYSIS NONAUTO W/O SCOPE: CPT | Performed by: NURSE PRACTITIONER

## 2024-03-18 PROCEDURE — PNV: Performed by: NURSE PRACTITIONER

## 2024-03-18 NOTE — TELEPHONE ENCOUNTER
"Patient called in with concerns of possible UTI.  Stated she has had an increase in frequency/urgency since yesterday with mild pelvic pressure.    Patient had routine OB visit scheduled for Friday.  Rescheduled patient to be seen today in office for OB visit and follow up on UTI symptoms.      She has no further questions at this time.        Reason for Disposition   Urinating more frequently than usual (i.e., frequency)    Answer Assessment - Initial Assessment Questions  1. SYMPTOM: \"What's the main symptom you're concerned about?\" (e.g., frequency, incontinence)      Frequency    2. ONSET: \"When did the symptoms start?\"      Yesterday    3. PAIN: \"Is there any pain?\" If Yes, ask: \"How bad is it?\" (Scale: 1-10; mild, moderate, severe)      Mild pressure    4. CAUSE: \"What do you think is causing the symptoms?\"      N/a    5. OTHER SYMPTOMS: \"Do you have any other symptoms?\" (e.g., fever, flank pain, blood in urine, pain with urination)      Declines    6. PREGNANCY: \"Is there any chance you are pregnant?\" \"When was your last menstrual period?\"      GYPSY 05/16/2024    Protocols used: Urinary Symptoms-ADULT-[place]    "

## 2024-03-18 NOTE — PROGRESS NOTES
Routine Prenatal Visit  [facility] - [place]  [address], [address], [place] [address]    Assessment/Plan: Discussed urinary symptoms, common to have frequency during pregnancy.  Culture to lab, normal pelvic exam. Call with any new or worsening symptoms.   Return visit in 2 weeks/PRN.      Kassie is a 28 y.o. year old  at 31w4d who presents for routine prenatal visit.     1. Urinary urgency  -     POCT urine dip  -     UA/M w/rflx Culture, Routine    2. Genital herpes affecting pregnancy in third trimester    3. 31 weeks gestation of pregnancy    4. Family history of genetic disorder    5. History of marijuana use    6. Beta thalassemia minor        Next OB Visit 2 weeks.    Subjective:     CC: Prenatal care    Kassie Moya is a 28 y.o.  female who presents for routine prenatal care at 31w4d. She is complaining of feeling sore in her vaginal area, denies discharge, itching or odor.  Having urinary frequency, denies dysuria.  No back pains or fevers, otherwise feels well.   Pregnancy ROS: no leakage of fluid, pelvic pain, or vaginal bleeding.  normal fetal movement.    The following portions of the patient's history were reviewed and updated as appropriate: allergies, current medications, past family history, past medical history, obstetric history, gynecologic history, past social history, past surgical history and problem list.      Objective:  /70   Wt 84.5 kg (186 lb 3.2 oz)   LMP 08/10/2023 (Exact Date)   BMI 30.99 kg/m²   Pregravid Weight/BMI: 70.3 kg (155 lb) (BMI 25.79)  Current Weight: 84.5 kg (186 lb 3.2 oz)   Total Weight Gain: 14.2 kg (31 lb 3.2 oz)   Pre-Liz Vitals      Flowsheet Row Most Recent Value   Prenatal Assessment    Fetal Heart Rate 145   Fundal Height (cm) 31 cm   Movement Present   Presentation Vertex   Prenatal Vitals    Blood Pressure 112/70   Weight - Scale 84.5 kg (186 lb 3.2 oz)   Urine Albumin/Glucose    Dilation/Effacement/Station    Vaginal  Drainage    Draining Fluid No   Edema    LLE Edema None   RLE Edema None   Facial Edema None             General: Well appearing, no distress  Abdomen: Soft, gravid, nontender  Extremities: Non tender.  Pelvis: External vulva normal appearance, no lesions or erythema.  Speculum exam: Normal vaginal tissue, no lesions, normal appearing discharge.  Cervix appears closed/thick. Wet mount: negative clue, negative trich, negative yeast, ph 4.0.

## 2024-03-18 NOTE — PATIENT INSTRUCTIONS
Call with any new or worsening symptoms, or if you feel contractions, leaking, bleeding or decreased baby movements  Return visit in 2 weeks

## 2024-03-20 LAB
APPEARANCE UR: CLEAR
BACTERIA UR CULT: NORMAL
BACTERIA URNS QL MICRO: NORMAL
BILIRUB UR QL STRIP: NEGATIVE
CASTS URNS QL MICRO: NORMAL /LPF
COLOR UR: YELLOW
EPI CELLS #/AREA URNS HPF: NORMAL /HPF (ref 0–10)
GLUCOSE UR QL: NEGATIVE
HGB UR QL STRIP: NEGATIVE
KETONES UR QL STRIP: NEGATIVE
LEUKOCYTE ESTERASE UR QL STRIP: ABNORMAL
Lab: NO GROWTH
MICRO URNS: ABNORMAL
NITRITE UR QL STRIP: NEGATIVE
PH UR STRIP: 6.5 [PH] (ref 5–7.5)
PROT UR QL STRIP: NEGATIVE
RBC #/AREA URNS HPF: NORMAL /HPF (ref 0–2)
SL AMB URINALYSIS REFLEX: ABNORMAL
SP GR UR: <=1.005 (ref 1–1.03)
UROBILINOGEN UR STRIP-ACNC: 0.2 MG/DL (ref 0.2–1)
WBC #/AREA URNS HPF: NORMAL /HPF (ref 0–5)

## 2024-03-29 ENCOUNTER — ULTRASOUND (OUTPATIENT)
Dept: PERINATAL CARE | Facility: OTHER | Age: 29
End: 2024-03-29
Payer: COMMERCIAL

## 2024-03-29 VITALS
HEART RATE: 96 BPM | WEIGHT: 191.2 LBS | SYSTOLIC BLOOD PRESSURE: 114 MMHG | DIASTOLIC BLOOD PRESSURE: 70 MMHG | BODY MASS INDEX: 31.86 KG/M2 | HEIGHT: 65 IN

## 2024-03-29 DIAGNOSIS — D56.3 BETA THALASSEMIA MINOR: Primary | ICD-10-CM

## 2024-03-29 DIAGNOSIS — O99.013 ANEMIA DURING PREGNANCY IN THIRD TRIMESTER: ICD-10-CM

## 2024-03-29 DIAGNOSIS — Z3A.33 33 WEEKS GESTATION OF PREGNANCY: ICD-10-CM

## 2024-03-29 DIAGNOSIS — Z36.2 ENCOUNTER FOR FOLLOW-UP ULTRASOUND OF FETAL ANATOMY: ICD-10-CM

## 2024-03-29 PROCEDURE — 76816 OB US FOLLOW-UP PER FETUS: CPT | Performed by: OBSTETRICS & GYNECOLOGY

## 2024-03-29 PROCEDURE — 99213 OFFICE O/P EST LOW 20 MIN: CPT | Performed by: OBSTETRICS & GYNECOLOGY

## 2024-03-29 NOTE — PROGRESS NOTES
Kassie Moya has no complaints today.  She reports regular fetal movements and does not report any problems.  She is here today at 33w1d for an ultrasound for fetal growth and missed anatomy.    Problem list:  Thalassemia diagnosed in this pregnancy.  She reports that her partner was seen by genetic counseling and he was screened and is not felt to be a carrier.  She has mild anemia with a hemoglobin of 10.2, MCV of 68.     Ultrasound findings:  The ultrasound today shows normal interval fetal growth and fluid.  Prior missed anatomy was reviewed and appears normal.  This completes the anatomical survey.    Pregnancy ultrasound has limitations and is unable to detect all forms of fetal congenital abnormalities.  The inaccuracy in the EFW can be off by 1 lb either way in the third trimester.    Specific counseling was provided on the following problems:  1.  She was started on iron because of mild anemia.  In patients with thalassemia they have a shortened red blood cell life and sometimes this can lead to maternal anemia with normal iron stores and this may not improve with oral iron and may put the patient at increased risk for iron overload.    Follow up recommended:   No further growth scans are recommended at this time.  A CBC and ferritin level was ordered today for her to complete to make sure she would benefit from oral iron and to prove that her iron levels are not elevated before she continues her oral iron.  We also reviewed how to take iron as its absorption is inhibited by calcium products, coffee, tea, and medication for reflux.  Iron absorption may be improved by vitamin C and taking it every other day.     Pre visit time reviewing her records   5 minutes  Face to face time 10 minutes  Post visit time on documentation of note, updating her problem list, adding orders and prescriptions 5 minutes.  Procedures that were completed today were charged separately.   The level of decision making was low level  complexity.    Elizabeth Mcleod MD

## 2024-03-29 NOTE — LETTER
March 30, 2024     Francisco Black MD  [address]  [address]  [place] [address]    Patient: Kassie Moya   YOB: 1995   Date of Visit: 3/29/2024       Dear Dr. Black:    Thank you for referring Kassie Moya to me for evaluation. Below are my notes for this consultation.    If you have questions, please do not hesitate to call me. I look forward to following your patient along with you.         Sincerely,        Elziabeth Mcleod MD        CC: No Recipients    Elizabeth Mcleod MD  3/30/2024 10:23 PM  Sign when Signing Visit  Kassie Moya has no complaints today.  She reports regular fetal movements and does not report any problems.  She is here today at 33w1d for an ultrasound for fetal growth and missed anatomy.    Problem list:  Thalassemia diagnosed in this pregnancy.  She reports that her partner was seen by genetic counseling and he was screened and is not felt to be a carrier.  She has mild anemia with a hemoglobin of 10.2, MCV of 68.     Ultrasound findings:  The ultrasound today shows normal interval fetal growth and fluid.  Prior missed anatomy was reviewed and appears normal.  This completes the anatomical survey.    Pregnancy ultrasound has limitations and is unable to detect all forms of fetal congenital abnormalities.  The inaccuracy in the EFW can be off by 1 lb either way in the third trimester.    Specific counseling was provided on the following problems:  1.  She was started on iron because of mild anemia.  In patients with thalassemia they have a shortened red blood cell life and sometimes this can lead to maternal anemia with normal iron stores and this may not improve with oral iron and may put the patient at increased risk for iron overload.    Follow up recommended:   No further growth scans are recommended at this time.  A CBC and ferritin level was ordered today for her to complete to make sure she would benefit from oral iron and to prove that her iron levels are  not elevated before she continues her oral iron.  We also reviewed how to take iron as its absorption is inhibited by calcium products, coffee, tea, and medication for reflux.  Iron absorption may be improved by vitamin C and taking it every other day.     Pre visit time reviewing her records   5 minutes  Face to face time 10 minutes  Post visit time on documentation of note, updating her problem list, adding orders and prescriptions 5 minutes.  Procedures that were completed today were charged separately.   The level of decision making was low level complexity.    Elizabeth Mcleod MD